# Patient Record
Sex: MALE | Race: WHITE | Employment: UNEMPLOYED | URBAN - METROPOLITAN AREA
[De-identification: names, ages, dates, MRNs, and addresses within clinical notes are randomized per-mention and may not be internally consistent; named-entity substitution may affect disease eponyms.]

---

## 2019-01-01 ENCOUNTER — OFFICE VISIT (OUTPATIENT)
Dept: FAMILY MEDICINE CLINIC | Facility: CLINIC | Age: 59
End: 2019-01-01

## 2019-01-01 ENCOUNTER — TELEPHONE (OUTPATIENT)
Dept: FAMILY MEDICINE CLINIC | Facility: CLINIC | Age: 59
End: 2019-01-01

## 2019-01-01 ENCOUNTER — DOCUMENTATION ONLY (OUTPATIENT)
Dept: FAMILY MEDICINE CLINIC | Facility: CLINIC | Age: 59
End: 2019-01-01

## 2019-01-01 VITALS
BODY MASS INDEX: 24.75 KG/M2 | HEIGHT: 66 IN | WEIGHT: 154 LBS | HEART RATE: 67 BPM | DIASTOLIC BLOOD PRESSURE: 60 MMHG | OXYGEN SATURATION: 96 % | SYSTOLIC BLOOD PRESSURE: 102 MMHG | TEMPERATURE: 97.7 F | RESPIRATION RATE: 16 BRPM

## 2019-01-01 DIAGNOSIS — F17.200 CURRENT SMOKER: ICD-10-CM

## 2019-01-01 DIAGNOSIS — G62.9 NEUROPATHY: ICD-10-CM

## 2019-01-01 DIAGNOSIS — L84 CALLUS OF FOOT: ICD-10-CM

## 2019-01-01 DIAGNOSIS — Z13.31 SCREENING FOR DEPRESSION: ICD-10-CM

## 2019-01-01 DIAGNOSIS — K43.9 ABDOMINAL WALL HERNIA: Primary | ICD-10-CM

## 2019-01-29 ENCOUNTER — OFFICE VISIT (OUTPATIENT)
Dept: FAMILY MEDICINE CLINIC | Facility: CLINIC | Age: 59
End: 2019-01-29

## 2019-01-29 VITALS
RESPIRATION RATE: 14 BRPM | OXYGEN SATURATION: 98 % | TEMPERATURE: 98 F | SYSTOLIC BLOOD PRESSURE: 160 MMHG | WEIGHT: 152 LBS | DIASTOLIC BLOOD PRESSURE: 101 MMHG | BODY MASS INDEX: 24.43 KG/M2 | HEIGHT: 66 IN | HEART RATE: 78 BPM

## 2019-01-29 DIAGNOSIS — F17.200 CURRENT SMOKER: ICD-10-CM

## 2019-01-29 DIAGNOSIS — F32.A DEPRESSION, UNSPECIFIED DEPRESSION TYPE: ICD-10-CM

## 2019-01-29 DIAGNOSIS — R03.0 ELEVATED BLOOD-PRESSURE READING WITHOUT DIAGNOSIS OF HYPERTENSION: Primary | ICD-10-CM

## 2019-01-29 DIAGNOSIS — Z12.11 SCREENING FOR COLON CANCER: ICD-10-CM

## 2019-01-29 DIAGNOSIS — G62.9 NEUROPATHY: ICD-10-CM

## 2019-01-29 DIAGNOSIS — L84 CALLUS OF FOOT: ICD-10-CM

## 2019-01-29 DIAGNOSIS — Z13.220 SCREENING FOR LIPID DISORDERS: ICD-10-CM

## 2019-01-29 DIAGNOSIS — R03.0 ELEVATED BLOOD-PRESSURE READING WITHOUT DIAGNOSIS OF HYPERTENSION: ICD-10-CM

## 2019-01-29 DIAGNOSIS — Z11.59 NEED FOR HEPATITIS C SCREENING TEST: ICD-10-CM

## 2019-01-29 DIAGNOSIS — F98.8 ATTENTION DEFICIT DISORDER, UNSPECIFIED HYPERACTIVITY PRESENCE: ICD-10-CM

## 2019-01-29 DIAGNOSIS — H53.452 DECREASED PERIPHERAL VISION OF LEFT EYE: ICD-10-CM

## 2019-01-29 DIAGNOSIS — Z78.9 HISTORY OF EXCESSIVE CERUMEN: ICD-10-CM

## 2019-01-29 RX ORDER — GABAPENTIN 300 MG/1
300 CAPSULE ORAL 3 TIMES DAILY
Qty: 90 CAP | Refills: 1 | Status: SHIPPED | OUTPATIENT
Start: 2019-01-29 | End: 2019-02-19 | Stop reason: DRUGHIGH
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
## 2019-02-19 ENCOUNTER — OFFICE VISIT (OUTPATIENT)
Dept: FAMILY MEDICINE CLINIC | Facility: CLINIC | Age: 59
End: 2019-02-19

## 2019-02-19 VITALS
TEMPERATURE: 98.2 F | HEART RATE: 62 BPM | WEIGHT: 151.8 LBS | DIASTOLIC BLOOD PRESSURE: 72 MMHG | SYSTOLIC BLOOD PRESSURE: 130 MMHG | BODY MASS INDEX: 24.4 KG/M2 | HEIGHT: 66 IN | OXYGEN SATURATION: 98 % | RESPIRATION RATE: 14 BRPM

## 2019-02-19 DIAGNOSIS — F32.A DEPRESSION, UNSPECIFIED DEPRESSION TYPE: ICD-10-CM

## 2019-02-19 DIAGNOSIS — G62.9 NEUROPATHY: Primary | ICD-10-CM

## 2019-02-19 DIAGNOSIS — F17.200 CURRENT SMOKER: ICD-10-CM

## 2019-02-19 DIAGNOSIS — Z11.59 NEED FOR HEPATITIS C SCREENING TEST: ICD-10-CM

## 2019-02-19 DIAGNOSIS — F98.8 ATTENTION DEFICIT DISORDER, UNSPECIFIED HYPERACTIVITY PRESENCE: ICD-10-CM

## 2019-02-19 DIAGNOSIS — H53.452 DECREASED PERIPHERAL VISION OF LEFT EYE: ICD-10-CM

## 2019-02-19 RX ORDER — GABAPENTIN 600 MG/1
600 TABLET ORAL 3 TIMES DAILY
Qty: 90 TAB | Refills: 2 | Status: SHIPPED | OUTPATIENT
Start: 2019-02-19

## 2019-02-19 RX ORDER — VARENICLINE TARTRATE 25 MG
KIT ORAL
Qty: 1 DOSE PACK | Refills: 0 | Status: SHIPPED | OUTPATIENT
Start: 2019-02-19
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
## 2019-02-19 NOTE — PROGRESS NOTES
Jorge Landry. is a 62 y.o.@ presents today for office visit for follow up. Pt is in Room # 4.     1. Have you been to the ER, urgent care clinic since your last visit? Hospitalized since your last visit? No    2. Have you seen or consulted any other health care providers outside of the [address] [place] [address] since your last visit? Include any pap smears or colon screening. No         Health Maintenance reviewed - up to date.  .    Requested Prescriptions      No prescriptions requested or ordered in this encounter       Visit Vitals  /72 (BP 1 Location: Left arm, BP Patient Position: Sitting)   Pulse 62   Temp 98.2 °F (36.8 °C) (Oral)   Resp 14   Ht 5' 6\" (1.676 m)   Wt 151 lb 12.8 oz (68.9 kg)   SpO2 98%   BMI 24.50 kg/m²          Upcoming Appts  No.     VORB: No orders of the defined types were placed in this encounter.   MD Deb Duran LPN

## 2019-02-23 LAB
ALBUMIN SERPL-MCNC: 4.3 G/DL (ref 3.6–4.8)
ALBUMIN/GLOB SERPL: 1.8 {RATIO} (ref 1.2–2.2)
ALP SERPL-CCNC: 79 IU/L (ref 39–117)
ALT SERPL-CCNC: 19 IU/L (ref 0–44)
AST SERPL-CCNC: 18 IU/L (ref 0–40)
BILIRUB SERPL-MCNC: <0.2 MG/DL (ref 0–1.2)
BUN SERPL-MCNC: 8 MG/DL (ref 8–27)
BUN/CREAT SERPL: 8 (ref 10–24)
CALCIUM SERPL-MCNC: 9.2 MG/DL (ref 8.6–10.2)
CHLORIDE SERPL-SCNC: 102 MMOL/L (ref 96–106)
CHOLEST SERPL-MCNC: 181 MG/DL (ref 100–199)
CO2 SERPL-SCNC: 23 MMOL/L (ref 20–29)
CREAT SERPL-MCNC: 1 MG/DL (ref 0.76–1.27)
ERYTHROCYTE [DISTWIDTH] IN BLOOD BY AUTOMATED COUNT: 14.5 % (ref 12.3–15.4)
FOLATE SERPL-MCNC: 11.6 NG/ML
GLOBULIN SER CALC-MCNC: 2.4 G/DL (ref 1.5–4.5)
GLUCOSE SERPL-MCNC: 86 MG/DL (ref 65–99)
HCT VFR BLD AUTO: 40.6 % (ref 37.5–51)
HCV AB S/CO SERPL IA: 1.1 S/CO RATIO (ref 0–0.9)
HCV AB S/CO SERPL IA: 1.1 S/CO RATIO (ref 0–0.9)
HCV RNA SERPL QL NAA+PROBE: NEGATIVE
HDLC SERPL-MCNC: 40 MG/DL
HEMOCCULT STL QL IA: NORMAL
HGB BLD-MCNC: 13.8 G/DL (ref 13–17.7)
INTERPRETATION, 910389: NORMAL
LDLC SERPL CALC-MCNC: 124 MG/DL (ref 0–99)
MCH RBC QN AUTO: 30.9 PG (ref 26.6–33)
MCHC RBC AUTO-ENTMCNC: 34 G/DL (ref 31.5–35.7)
MCV RBC AUTO: 91 FL (ref 79–97)
PLATELET # BLD AUTO: 379 X10E3/UL (ref 150–379)
POTASSIUM SERPL-SCNC: 5.3 MMOL/L (ref 3.5–5.2)
PROT SERPL-MCNC: 6.7 G/DL (ref 6–8.5)
RBC # BLD AUTO: 4.47 X10E6/UL (ref 4.14–5.8)
SODIUM SERPL-SCNC: 141 MMOL/L (ref 134–144)
TRIGL SERPL-MCNC: 83 MG/DL (ref 0–149)
TSH SERPL DL<=0.005 MIU/L-ACNC: 1.93 UIU/ML (ref 0.45–4.5)
VIT B12 SERPL-MCNC: 1097 PG/ML (ref 232–1245)
VLDLC SERPL CALC-MCNC: 17 MG/DL (ref 5–40)
WBC # BLD AUTO: 8 X10E3/UL (ref 3.4–10.8)

## 2019-03-12 NOTE — PROGRESS NOTES
CBC, TSH, vitamin B12 and Folate are wnl. CMP shows borderline elevated potassium. Will need to repeat lab. Lipid panel shows elevated LDL. Recommend lifestyle modification with healthy diet including fruits, vegetables, decreased saturated fats and carbohydrates, and regular exercise. Hepatitis C RNA is negative.

## 2019-03-25 PROBLEM — E78.00 PURE HYPERCHOLESTEROLEMIA: Status: ACTIVE | Noted: 2019-01-01

## 2019-03-25 PROBLEM — R03.0 ELEVATED BLOOD-PRESSURE READING WITHOUT DIAGNOSIS OF HYPERTENSION: Status: RESOLVED | Noted: 2019-01-29 | Resolved: 2019-01-01

## 2019-03-25 PROBLEM — F32.A DEPRESSION: Status: RESOLVED | Noted: 2019-01-29 | Resolved: 2019-01-01

## 2019-03-25 NOTE — PATIENT INSTRUCTIONS

## 2019-03-25 NOTE — TELEPHONE ENCOUNTER
Dr. Michelle Sousa requested that I come to the exam room to discuss Mr. Winsome Solis request for an increased dosage of gabapentin. Dr. Michelle Sousa informed me that Mr. Hardik Bolton was complaining of neuropathy, wanted to increase his dosage of gabapentin from 600mg three times a day to 900mg three times a day, and was not accepting her advise to see a podiatrist. She explained that his aggressiveness was making her uncomfortable to reenter the exam room. I entered the room and explained to Mr. Galindo that Dr. Michelle Sousa recommended that he see a podiatrist for his neuropathy instead of increasing his gabapentin dosage. Mr. Hardik Bolton became aggravated with this and said \"I'm on my fucking feet all day long. I just need the medicine to get by\". I explained that this was even more reason to visit a podiatrist since they are further trained to address his issues. He told me that we were done and told me to get out of the room. Upon exiting the room I told Mr. Hardik Bolton that we were here to help him and just wanted to see him feel better. He yelled that we [place] a fucking refugee for a doctor and he would not be coming back\". I escorted Mr. Hardik Bolton to the lobby were he continued the verbally abusive behavior.

## 2019-03-25 NOTE — PROGRESS NOTES
Marlene Briggs is a 62 y.o.  male presents today for office visit for follow up. Pt would also like to discuss stomach issues . Pt is not fasting. Pt is in Room # 3 1. Have you been to the ER, urgent care clinic since your last visit? Hospitalized since your last visit? No 
 
2. Have you seen or consulted any other health care providers outside of the [address] [place] [address] since your last visit? Include any pap smears or colon screening. No 
 
Upcoming Appts 
none Health Maintenance reviewed pneu 23: declined VORB: No orders of the defined types were placed in this encounter.  
Chela Cole LPN
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
## 2019-05-30 NOTE — TELEPHONE ENCOUNTER
Patient called back to ask about his refill of gabapentin. I reminded him that he had been discharged from the practice. I told him his last prescription was issued on 2/19/19 with two refills. He started screaming that he should have had three and continued with further abusive comments. I reminded the patient that he had been discharged from the practice and hung up.

## 2020-01-01 ENCOUNTER — APPOINTMENT (OUTPATIENT)
Dept: GENERAL RADIOLOGY | Age: 60
DRG: 224 | End: 2020-01-01
Attending: NURSE PRACTITIONER
Payer: MEDICAID

## 2020-01-01 ENCOUNTER — ANESTHESIA (OUTPATIENT)
Dept: SURGERY | Age: 60
DRG: 224 | End: 2020-01-01
Payer: MEDICAID

## 2020-01-01 ENCOUNTER — APPOINTMENT (OUTPATIENT)
Dept: CT IMAGING | Age: 60
DRG: 224 | End: 2020-01-01
Attending: EMERGENCY MEDICINE
Payer: MEDICAID

## 2020-01-01 ENCOUNTER — ANESTHESIA EVENT (OUTPATIENT)
Dept: MEDSURG UNIT | Age: 60
DRG: 224 | End: 2020-01-01
Payer: MEDICAID

## 2020-01-01 ENCOUNTER — ANESTHESIA EVENT (OUTPATIENT)
Dept: SURGERY | Age: 60
DRG: 224 | End: 2020-01-01
Payer: MEDICAID

## 2020-01-01 ENCOUNTER — HOSPITAL ENCOUNTER (INPATIENT)
Age: 60
LOS: 7 days | DRG: 224 | End: 2020-03-05
Attending: EMERGENCY MEDICINE | Admitting: SURGERY
Payer: MEDICAID

## 2020-01-01 ENCOUNTER — ANESTHESIA (OUTPATIENT)
Dept: MEDSURG UNIT | Age: 60
DRG: 224 | End: 2020-01-01
Payer: MEDICAID

## 2020-01-01 VITALS
SYSTOLIC BLOOD PRESSURE: 125 MMHG | HEIGHT: 66 IN | BODY MASS INDEX: 24.86 KG/M2 | HEART RATE: 100 BPM | OXYGEN SATURATION: 92 % | TEMPERATURE: 98.5 F | DIASTOLIC BLOOD PRESSURE: 80 MMHG | RESPIRATION RATE: 20 BRPM

## 2020-01-01 DIAGNOSIS — K56.609 SBO (SMALL BOWEL OBSTRUCTION) (HCC): ICD-10-CM

## 2020-01-01 DIAGNOSIS — K56.609 SMALL BOWEL OBSTRUCTION (HCC): Primary | ICD-10-CM

## 2020-01-01 LAB
ALBUMIN SERPL-MCNC: 4.4 G/DL (ref 3.4–5)
ALBUMIN/GLOB SERPL: 1.4 {RATIO} (ref 0.8–1.7)
ALP SERPL-CCNC: 93 U/L (ref 45–117)
ALT SERPL-CCNC: 32 U/L (ref 16–61)
ANION GAP SERPL CALC-SCNC: 11 MMOL/L (ref 3–18)
ANION GAP SERPL CALC-SCNC: 12 MMOL/L (ref 3–18)
ANION GAP SERPL CALC-SCNC: 8 MMOL/L (ref 3–18)
ANION GAP SERPL CALC-SCNC: 9 MMOL/L (ref 3–18)
ANION GAP SERPL CALC-SCNC: 9 MMOL/L (ref 3–18)
APPEARANCE UR: CLEAR
AST SERPL-CCNC: 25 U/L (ref 10–38)
BACTERIA URNS QL MICRO: ABNORMAL /HPF
BASOPHILS # BLD: 0 K/UL (ref 0–0.1)
BASOPHILS NFR BLD: 0 % (ref 0–2)
BILIRUB SERPL-MCNC: 1 MG/DL (ref 0.2–1)
BILIRUB UR QL: NEGATIVE
BUN SERPL-MCNC: 19 MG/DL (ref 7–18)
BUN SERPL-MCNC: 23 MG/DL (ref 7–18)
BUN SERPL-MCNC: 28 MG/DL (ref 7–18)
BUN SERPL-MCNC: 28 MG/DL (ref 7–18)
BUN SERPL-MCNC: 33 MG/DL (ref 7–18)
BUN/CREAT SERPL: 21 (ref 12–20)
BUN/CREAT SERPL: 27 (ref 12–20)
BUN/CREAT SERPL: 27 (ref 12–20)
BUN/CREAT SERPL: 28 (ref 12–20)
BUN/CREAT SERPL: 32 (ref 12–20)
CALCIUM SERPL-MCNC: 8.4 MG/DL (ref 8.5–10.1)
CALCIUM SERPL-MCNC: 8.5 MG/DL (ref 8.5–10.1)
CALCIUM SERPL-MCNC: 8.5 MG/DL (ref 8.5–10.1)
CALCIUM SERPL-MCNC: 9.1 MG/DL (ref 8.5–10.1)
CALCIUM SERPL-MCNC: 9.7 MG/DL (ref 8.5–10.1)
CHLORIDE SERPL-SCNC: 93 MMOL/L (ref 100–111)
CHLORIDE SERPL-SCNC: 94 MMOL/L (ref 100–111)
CHLORIDE SERPL-SCNC: 94 MMOL/L (ref 100–111)
CHLORIDE SERPL-SCNC: 97 MMOL/L (ref 100–111)
CHLORIDE SERPL-SCNC: 98 MMOL/L (ref 100–111)
CO2 SERPL-SCNC: 24 MMOL/L (ref 21–32)
CO2 SERPL-SCNC: 26 MMOL/L (ref 21–32)
CO2 SERPL-SCNC: 27 MMOL/L (ref 21–32)
CO2 SERPL-SCNC: 34 MMOL/L (ref 21–32)
CO2 SERPL-SCNC: 36 MMOL/L (ref 21–32)
COLOR UR: YELLOW
CREAT SERPL-MCNC: 0.85 MG/DL (ref 0.6–1.3)
CREAT SERPL-MCNC: 0.88 MG/DL (ref 0.6–1.3)
CREAT SERPL-MCNC: 0.91 MG/DL (ref 0.6–1.3)
CREAT SERPL-MCNC: 1.01 MG/DL (ref 0.6–1.3)
CREAT SERPL-MCNC: 1.23 MG/DL (ref 0.6–1.3)
DIFFERENTIAL METHOD BLD: ABNORMAL
EOSINOPHIL # BLD: 0 K/UL (ref 0–0.4)
EOSINOPHIL NFR BLD: 0 % (ref 0–5)
ERYTHROCYTE [DISTWIDTH] IN BLOOD BY AUTOMATED COUNT: 12.9 % (ref 11.6–14.5)
ERYTHROCYTE [DISTWIDTH] IN BLOOD BY AUTOMATED COUNT: 13.2 % (ref 11.6–14.5)
ERYTHROCYTE [DISTWIDTH] IN BLOOD BY AUTOMATED COUNT: 13.2 % (ref 11.6–14.5)
ERYTHROCYTE [DISTWIDTH] IN BLOOD BY AUTOMATED COUNT: 13.4 % (ref 11.6–14.5)
GLOBULIN SER CALC-MCNC: 3.1 G/DL (ref 2–4)
GLUCOSE BLD STRIP.AUTO-MCNC: 105 MG/DL (ref 70–110)
GLUCOSE BLD STRIP.AUTO-MCNC: 96 MG/DL (ref 70–110)
GLUCOSE SERPL-MCNC: 114 MG/DL (ref 74–99)
GLUCOSE SERPL-MCNC: 122 MG/DL (ref 74–99)
GLUCOSE SERPL-MCNC: 123 MG/DL (ref 74–99)
GLUCOSE SERPL-MCNC: 156 MG/DL (ref 74–99)
GLUCOSE SERPL-MCNC: 94 MG/DL (ref 74–99)
GLUCOSE UR STRIP.AUTO-MCNC: 100 MG/DL
HCT VFR BLD AUTO: 38.9 % (ref 36–48)
HCT VFR BLD AUTO: 39.4 % (ref 36–48)
HCT VFR BLD AUTO: 40.3 % (ref 36–48)
HCT VFR BLD AUTO: 41 % (ref 36–48)
HGB BLD-MCNC: 13.2 G/DL (ref 13–16)
HGB BLD-MCNC: 13.3 G/DL (ref 13–16)
HGB BLD-MCNC: 14.1 G/DL (ref 13–16)
HGB BLD-MCNC: 14.3 G/DL (ref 13–16)
HGB UR QL STRIP: NEGATIVE
KETONES UR QL STRIP.AUTO: 40 MG/DL
LEUKOCYTE ESTERASE UR QL STRIP.AUTO: NEGATIVE
LIPASE SERPL-CCNC: 91 U/L (ref 73–393)
LYMPHOCYTES # BLD: 0.3 K/UL (ref 0.9–3.6)
LYMPHOCYTES # BLD: 0.4 K/UL (ref 0.9–3.6)
LYMPHOCYTES # BLD: 0.6 K/UL (ref 0.9–3.6)
LYMPHOCYTES # BLD: 1.3 K/UL (ref 0.9–3.6)
LYMPHOCYTES NFR BLD: 13 % (ref 21–52)
LYMPHOCYTES NFR BLD: 15 % (ref 21–52)
LYMPHOCYTES NFR BLD: 6 % (ref 21–52)
LYMPHOCYTES NFR BLD: 7 % (ref 21–52)
MCH RBC QN AUTO: 30.9 PG (ref 24–34)
MCH RBC QN AUTO: 31.2 PG (ref 24–34)
MCH RBC QN AUTO: 31.4 PG (ref 24–34)
MCH RBC QN AUTO: 31.5 PG (ref 24–34)
MCHC RBC AUTO-ENTMCNC: 33.8 G/DL (ref 31–37)
MCHC RBC AUTO-ENTMCNC: 33.9 G/DL (ref 31–37)
MCHC RBC AUTO-ENTMCNC: 34.9 G/DL (ref 31–37)
MCHC RBC AUTO-ENTMCNC: 35 G/DL (ref 31–37)
MCV RBC AUTO: 89.5 FL (ref 74–97)
MCV RBC AUTO: 90 FL (ref 74–97)
MCV RBC AUTO: 91.1 FL (ref 74–97)
MCV RBC AUTO: 92.9 FL (ref 74–97)
MONOCYTES # BLD: 0.1 K/UL (ref 0.05–1.2)
MONOCYTES # BLD: 0.7 K/UL (ref 0.05–1.2)
MONOCYTES # BLD: 0.7 K/UL (ref 0.05–1.2)
MONOCYTES # BLD: 0.8 K/UL (ref 0.05–1.2)
MONOCYTES NFR BLD: 1 % (ref 3–10)
MONOCYTES NFR BLD: 14 % (ref 3–10)
MONOCYTES NFR BLD: 15 % (ref 3–10)
MONOCYTES NFR BLD: 20 % (ref 3–10)
NEUTS SEG # BLD: 2.4 K/UL (ref 1.8–8)
NEUTS SEG # BLD: 3.7 K/UL (ref 1.8–8)
NEUTS SEG # BLD: 4.3 K/UL (ref 1.8–8)
NEUTS SEG # BLD: 8.8 K/UL (ref 1.8–8)
NEUTS SEG NFR BLD: 65 % (ref 40–73)
NEUTS SEG NFR BLD: 78 % (ref 40–73)
NEUTS SEG NFR BLD: 80 % (ref 40–73)
NEUTS SEG NFR BLD: 86 % (ref 40–73)
NITRITE UR QL STRIP.AUTO: NEGATIVE
PH UR STRIP: 6 [PH] (ref 5–8)
PLATELET # BLD AUTO: 247 K/UL (ref 135–420)
PLATELET # BLD AUTO: 265 K/UL (ref 135–420)
PLATELET # BLD AUTO: 285 K/UL (ref 135–420)
PLATELET # BLD AUTO: 293 K/UL (ref 135–420)
PMV BLD AUTO: 10 FL (ref 9.2–11.8)
PMV BLD AUTO: 10.2 FL (ref 9.2–11.8)
PMV BLD AUTO: 9.7 FL (ref 9.2–11.8)
PMV BLD AUTO: 9.7 FL (ref 9.2–11.8)
POTASSIUM SERPL-SCNC: 2.8 MMOL/L (ref 3.5–5.5)
POTASSIUM SERPL-SCNC: 3.1 MMOL/L (ref 3.5–5.5)
POTASSIUM SERPL-SCNC: 3.5 MMOL/L (ref 3.5–5.5)
POTASSIUM SERPL-SCNC: 3.7 MMOL/L (ref 3.5–5.5)
POTASSIUM SERPL-SCNC: 3.9 MMOL/L (ref 3.5–5.5)
PROT SERPL-MCNC: 7.5 G/DL (ref 6.4–8.2)
PROT UR STRIP-MCNC: 30 MG/DL
RBC # BLD AUTO: 4.24 M/UL (ref 4.7–5.5)
RBC # BLD AUTO: 4.27 M/UL (ref 4.7–5.5)
RBC # BLD AUTO: 4.48 M/UL (ref 4.7–5.5)
RBC # BLD AUTO: 4.58 M/UL (ref 4.7–5.5)
RBC #/AREA URNS HPF: ABNORMAL /HPF (ref 0–5)
SODIUM SERPL-SCNC: 129 MMOL/L (ref 136–145)
SODIUM SERPL-SCNC: 132 MMOL/L (ref 136–145)
SODIUM SERPL-SCNC: 132 MMOL/L (ref 136–145)
SODIUM SERPL-SCNC: 137 MMOL/L (ref 136–145)
SODIUM SERPL-SCNC: 142 MMOL/L (ref 136–145)
SP GR UR REFRACTOMETRY: 1.03 (ref 1–1.03)
UROBILINOGEN UR QL STRIP.AUTO: 1 EU/DL (ref 0.2–1)
WBC # BLD AUTO: 10.2 K/UL (ref 4.6–13.2)
WBC # BLD AUTO: 3.7 K/UL (ref 4.6–13.2)
WBC # BLD AUTO: 4.7 K/UL (ref 4.6–13.2)
WBC # BLD AUTO: 5.5 K/UL (ref 4.6–13.2)
WBC URNS QL MICRO: ABNORMAL /HPF (ref 0–4)

## 2020-01-01 PROCEDURE — 99285 EMERGENCY DEPT VISIT HI MDM: CPT

## 2020-01-01 PROCEDURE — 96376 TX/PRO/DX INJ SAME DRUG ADON: CPT

## 2020-01-01 PROCEDURE — 65270000029 HC RM PRIVATE

## 2020-01-01 PROCEDURE — 74011250636 HC RX REV CODE- 250/636: Performed by: NURSE PRACTITIONER

## 2020-01-01 PROCEDURE — 5A1935Z RESPIRATORY VENTILATION, LESS THAN 24 CONSECUTIVE HOURS: ICD-10-PCS | Performed by: INTERNAL MEDICINE

## 2020-01-01 PROCEDURE — 0D9670Z DRAINAGE OF STOMACH WITH DRAINAGE DEVICE, VIA NATURAL OR ARTIFICIAL OPENING: ICD-10-PCS | Performed by: SURGERY

## 2020-01-01 PROCEDURE — 74177 CT ABD & PELVIS W/CONTRAST: CPT

## 2020-01-01 PROCEDURE — 74011250636 HC RX REV CODE- 250/636: Performed by: EMERGENCY MEDICINE

## 2020-01-01 PROCEDURE — 83690 ASSAY OF LIPASE: CPT

## 2020-01-01 PROCEDURE — C9113 INJ PANTOPRAZOLE SODIUM, VIA: HCPCS | Performed by: EMERGENCY MEDICINE

## 2020-01-01 PROCEDURE — 77030031139 HC SUT VCRL2 J&J -A: Performed by: SURGERY

## 2020-01-01 PROCEDURE — 80048 BASIC METABOLIC PNL TOTAL CA: CPT

## 2020-01-01 PROCEDURE — 82962 GLUCOSE BLOOD TEST: CPT

## 2020-01-01 PROCEDURE — 77030033827 HC SLV COMPR SCD THGH COVD -B: Performed by: SURGERY

## 2020-01-01 PROCEDURE — 5A12012 PERFORMANCE OF CARDIAC OUTPUT, SINGLE, MANUAL: ICD-10-PCS | Performed by: INTERNAL MEDICINE

## 2020-01-01 PROCEDURE — 77030008771 HC TU NG SALEM SUMP -A

## 2020-01-01 PROCEDURE — 77030011267 HC ELECTRD BLD COVD -A: Performed by: SURGERY

## 2020-01-01 PROCEDURE — 74011250636 HC RX REV CODE- 250/636: Performed by: SURGERY

## 2020-01-01 PROCEDURE — 43752 NASAL/OROGASTRIC W/TUBE PLMT: CPT

## 2020-01-01 PROCEDURE — 85025 COMPLETE CBC W/AUTO DIFF WBC: CPT

## 2020-01-01 PROCEDURE — 0CJY7ZZ INSPECTION OF MOUTH AND THROAT, VIA NATURAL OR ARTIFICIAL OPENING: ICD-10-PCS | Performed by: RADIOLOGY

## 2020-01-01 PROCEDURE — 36415 COLL VENOUS BLD VENIPUNCTURE: CPT

## 2020-01-01 PROCEDURE — 76210000016 HC OR PH I REC 1 TO 1.5 HR: Performed by: SURGERY

## 2020-01-01 PROCEDURE — 77030003028 HC SUT VCRL J&J -A: Performed by: SURGERY

## 2020-01-01 PROCEDURE — 74011000250 HC RX REV CODE- 250: Performed by: NURSE ANESTHETIST, CERTIFIED REGISTERED

## 2020-01-01 PROCEDURE — 74011000250 HC RX REV CODE- 250: Performed by: SURGERY

## 2020-01-01 PROCEDURE — 77030008477 HC STYL SATN SLP COVD -A: Performed by: ANESTHESIOLOGY

## 2020-01-01 PROCEDURE — 74011250636 HC RX REV CODE- 250/636

## 2020-01-01 PROCEDURE — 81001 URINALYSIS AUTO W/SCOPE: CPT

## 2020-01-01 PROCEDURE — 92950 HEART/LUNG RESUSCITATION CPR: CPT

## 2020-01-01 PROCEDURE — 77030018723 HC ELCTRD BLD COVD -A: Performed by: SURGERY

## 2020-01-01 PROCEDURE — 97112 NEUROMUSCULAR REEDUCATION: CPT

## 2020-01-01 PROCEDURE — 77030008771 HC TU NG SALEM SUMP -A: Performed by: ANESTHESIOLOGY

## 2020-01-01 PROCEDURE — 76060000033 HC ANESTHESIA 1 TO 1.5 HR: Performed by: SURGERY

## 2020-01-01 PROCEDURE — 77030011278 HC ELECTRD LIG IMPT COVD -F: Performed by: SURGERY

## 2020-01-01 PROCEDURE — 77030002966 HC SUT PDS J&J -A: Performed by: SURGERY

## 2020-01-01 PROCEDURE — 96374 THER/PROPH/DIAG INJ IV PUSH: CPT

## 2020-01-01 PROCEDURE — 0DQV0ZZ REPAIR MESENTERY, OPEN APPROACH: ICD-10-PCS | Performed by: SURGERY

## 2020-01-01 PROCEDURE — 96375 TX/PRO/DX INJ NEW DRUG ADDON: CPT

## 2020-01-01 PROCEDURE — 0DNW0ZZ RELEASE PERITONEUM, OPEN APPROACH: ICD-10-PCS | Performed by: SURGERY

## 2020-01-01 PROCEDURE — 77030018846 HC SOL IRR STRL H20 ICUM -A: Performed by: SURGERY

## 2020-01-01 PROCEDURE — 74011636320 HC RX REV CODE- 636/320: Performed by: EMERGENCY MEDICINE

## 2020-01-01 PROCEDURE — 88304 TISSUE EXAM BY PATHOLOGIST: CPT

## 2020-01-01 PROCEDURE — 77030018842 HC SOL IRR SOD CL 9% BAXT -A: Performed by: SURGERY

## 2020-01-01 PROCEDURE — 0BH17EZ INSERTION OF ENDOTRACHEAL AIRWAY INTO TRACHEA, VIA NATURAL OR ARTIFICIAL OPENING: ICD-10-PCS | Performed by: NURSE ANESTHETIST, CERTIFIED REGISTERED

## 2020-01-01 PROCEDURE — 74011250636 HC RX REV CODE- 250/636: Performed by: NURSE ANESTHETIST, CERTIFIED REGISTERED

## 2020-01-01 PROCEDURE — 74011000272 HC RX REV CODE- 272: Performed by: SURGERY

## 2020-01-01 PROCEDURE — 97116 GAIT TRAINING THERAPY: CPT

## 2020-01-01 PROCEDURE — 88305 TISSUE EXAM BY PATHOLOGIST: CPT

## 2020-01-01 PROCEDURE — 80053 COMPREHEN METABOLIC PANEL: CPT

## 2020-01-01 PROCEDURE — 5A19054 RESPIRATORY VENTILATION, SINGLE, NONMECHANICAL: ICD-10-PCS | Performed by: INTERNAL MEDICINE

## 2020-01-01 PROCEDURE — 77030011276 HC ELECTRD LIG AXS COVD -E: Performed by: SURGERY

## 2020-01-01 PROCEDURE — 77030008462 HC STPLR SKN PROX J&J -A: Performed by: SURGERY

## 2020-01-01 PROCEDURE — 77030013079 HC BLNKT BAIR HGGR 3M -A: Performed by: ANESTHESIOLOGY

## 2020-01-01 PROCEDURE — 77030008683 HC TU ET CUF COVD -A: Performed by: ANESTHESIOLOGY

## 2020-01-01 PROCEDURE — 0WPG0JZ REMOVAL OF SYNTHETIC SUBSTITUTE FROM PERITONEAL CAVITY, OPEN APPROACH: ICD-10-PCS | Performed by: SURGERY

## 2020-01-01 PROCEDURE — 76010000149 HC OR TIME 1 TO 1.5 HR: Performed by: SURGERY

## 2020-01-01 PROCEDURE — 97162 PT EVAL MOD COMPLEX 30 MIN: CPT

## 2020-01-01 RX ORDER — ONDANSETRON 2 MG/ML
4 INJECTION INTRAMUSCULAR; INTRAVENOUS
Status: COMPLETED | OUTPATIENT
Start: 2020-01-01 | End: 2020-01-01

## 2020-01-01 RX ORDER — INSULIN LISPRO 100 [IU]/ML
INJECTION, SOLUTION INTRAVENOUS; SUBCUTANEOUS ONCE
Status: DISCONTINUED | OUTPATIENT
Start: 2020-01-01 | End: 2020-01-01 | Stop reason: HOSPADM

## 2020-01-01 RX ORDER — METOCLOPRAMIDE HYDROCHLORIDE 5 MG/ML
5 INJECTION INTRAMUSCULAR; INTRAVENOUS
Status: COMPLETED | OUTPATIENT
Start: 2020-01-01 | End: 2020-01-01

## 2020-01-01 RX ORDER — NALOXONE HYDROCHLORIDE 0.4 MG/ML
0.04 INJECTION, SOLUTION INTRAMUSCULAR; INTRAVENOUS; SUBCUTANEOUS AS NEEDED
Status: DISCONTINUED | OUTPATIENT
Start: 2020-01-01 | End: 2020-01-01 | Stop reason: HOSPADM

## 2020-01-01 RX ORDER — SODIUM CHLORIDE 0.9 % (FLUSH) 0.9 %
5-40 SYRINGE (ML) INJECTION AS NEEDED
Status: DISCONTINUED | OUTPATIENT
Start: 2020-01-01 | End: 2020-01-01 | Stop reason: HOSPADM

## 2020-01-01 RX ORDER — MORPHINE SULFATE 4 MG/ML
4 INJECTION, SOLUTION INTRAMUSCULAR; INTRAVENOUS
Status: COMPLETED | OUTPATIENT
Start: 2020-01-01 | End: 2020-01-01

## 2020-01-01 RX ORDER — ONDANSETRON 2 MG/ML
INJECTION INTRAMUSCULAR; INTRAVENOUS AS NEEDED
Status: DISCONTINUED | OUTPATIENT
Start: 2020-01-01 | End: 2020-01-01 | Stop reason: HOSPADM

## 2020-01-01 RX ORDER — KETOROLAC TROMETHAMINE 30 MG/ML
30 INJECTION, SOLUTION INTRAMUSCULAR; INTRAVENOUS EVERY 6 HOURS
Status: DISCONTINUED | OUTPATIENT
Start: 2020-01-01 | End: 2020-01-01 | Stop reason: HOSPADM

## 2020-01-01 RX ORDER — ONDANSETRON 2 MG/ML
6 INJECTION INTRAMUSCULAR; INTRAVENOUS
Status: DISCONTINUED | OUTPATIENT
Start: 2020-01-01 | End: 2020-01-01 | Stop reason: HOSPADM

## 2020-01-01 RX ORDER — LIDOCAINE HYDROCHLORIDE 20 MG/ML
INJECTION, SOLUTION EPIDURAL; INFILTRATION; INTRACAUDAL; PERINEURAL AS NEEDED
Status: DISCONTINUED | OUTPATIENT
Start: 2020-01-01 | End: 2020-01-01 | Stop reason: HOSPADM

## 2020-01-01 RX ORDER — OXYCODONE AND ACETAMINOPHEN 5; 325 MG/1; MG/1
1 TABLET ORAL ONCE
Status: DISCONTINUED | OUTPATIENT
Start: 2020-01-01 | End: 2020-01-01 | Stop reason: HOSPADM

## 2020-01-01 RX ORDER — POTASSIUM CHLORIDE 7.45 MG/ML
10 INJECTION INTRAVENOUS
Status: COMPLETED | OUTPATIENT
Start: 2020-01-01 | End: 2020-01-01

## 2020-01-01 RX ORDER — NALOXONE HYDROCHLORIDE 0.4 MG/ML
0.4 INJECTION, SOLUTION INTRAMUSCULAR; INTRAVENOUS; SUBCUTANEOUS AS NEEDED
Status: DISCONTINUED | OUTPATIENT
Start: 2020-01-01 | End: 2020-01-01 | Stop reason: HOSPADM

## 2020-01-01 RX ORDER — HYDROMORPHONE HYDROCHLORIDE 1 MG/ML
INJECTION, SOLUTION INTRAMUSCULAR; INTRAVENOUS; SUBCUTANEOUS
Status: DISPENSED
Start: 2020-01-01 | End: 2020-01-01

## 2020-01-01 RX ORDER — SODIUM CHLORIDE, SODIUM LACTATE, POTASSIUM CHLORIDE, CALCIUM CHLORIDE 600; 310; 30; 20 MG/100ML; MG/100ML; MG/100ML; MG/100ML
100 INJECTION, SOLUTION INTRAVENOUS CONTINUOUS
Status: DISCONTINUED | OUTPATIENT
Start: 2020-01-01 | End: 2020-01-01 | Stop reason: ALTCHOICE

## 2020-01-01 RX ORDER — MORPHINE SULFATE 2 MG/ML
4 INJECTION, SOLUTION INTRAMUSCULAR; INTRAVENOUS ONCE
Status: COMPLETED | OUTPATIENT
Start: 2020-01-01 | End: 2020-01-01

## 2020-01-01 RX ORDER — CEFAZOLIN SODIUM 1 G/3ML
INJECTION, POWDER, FOR SOLUTION INTRAMUSCULAR; INTRAVENOUS AS NEEDED
Status: DISCONTINUED | OUTPATIENT
Start: 2020-01-01 | End: 2020-01-01 | Stop reason: HOSPADM

## 2020-01-01 RX ORDER — HYDROMORPHONE HYDROCHLORIDE 1 MG/ML
1 INJECTION, SOLUTION INTRAMUSCULAR; INTRAVENOUS; SUBCUTANEOUS
Status: DISCONTINUED | OUTPATIENT
Start: 2020-01-01 | End: 2020-01-01

## 2020-01-01 RX ORDER — SODIUM CHLORIDE 0.9 % (FLUSH) 0.9 %
5-40 SYRINGE (ML) INJECTION EVERY 8 HOURS
Status: DISCONTINUED | OUTPATIENT
Start: 2020-01-01 | End: 2020-01-01 | Stop reason: HOSPADM

## 2020-01-01 RX ORDER — VECURONIUM BROMIDE FOR INJECTION 1 MG/ML
INJECTION, POWDER, LYOPHILIZED, FOR SOLUTION INTRAVENOUS AS NEEDED
Status: DISCONTINUED | OUTPATIENT
Start: 2020-01-01 | End: 2020-01-01 | Stop reason: HOSPADM

## 2020-01-01 RX ORDER — LIDOCAINE HYDROCHLORIDE 20 MG/ML
15 SOLUTION OROPHARYNGEAL AS NEEDED
Status: DISCONTINUED | OUTPATIENT
Start: 2020-01-01 | End: 2020-01-01 | Stop reason: HOSPADM

## 2020-01-01 RX ORDER — HYDROMORPHONE HYDROCHLORIDE 1 MG/ML
INJECTION, SOLUTION INTRAMUSCULAR; INTRAVENOUS; SUBCUTANEOUS
Status: COMPLETED
Start: 2020-01-01 | End: 2020-01-01

## 2020-01-01 RX ORDER — PANTOPRAZOLE SODIUM 40 MG/10ML
40 INJECTION, POWDER, LYOPHILIZED, FOR SOLUTION INTRAVENOUS
Status: COMPLETED | OUTPATIENT
Start: 2020-01-01 | End: 2020-01-01

## 2020-01-01 RX ORDER — HYDROMORPHONE HYDROCHLORIDE 1 MG/ML
2 INJECTION, SOLUTION INTRAMUSCULAR; INTRAVENOUS; SUBCUTANEOUS
Status: DISCONTINUED | OUTPATIENT
Start: 2020-01-01 | End: 2020-01-01 | Stop reason: HOSPADM

## 2020-01-01 RX ORDER — MAGNESIUM SULFATE 100 %
4 CRYSTALS MISCELLANEOUS AS NEEDED
Status: DISCONTINUED | OUTPATIENT
Start: 2020-01-01 | End: 2020-01-01 | Stop reason: HOSPADM

## 2020-01-01 RX ORDER — ONDANSETRON 2 MG/ML
4 INJECTION INTRAMUSCULAR; INTRAVENOUS ONCE
Status: COMPLETED | OUTPATIENT
Start: 2020-01-01 | End: 2020-01-01

## 2020-01-01 RX ORDER — HYDROMORPHONE HYDROCHLORIDE 1 MG/ML
INJECTION, SOLUTION INTRAMUSCULAR; INTRAVENOUS; SUBCUTANEOUS AS NEEDED
Status: DISCONTINUED | OUTPATIENT
Start: 2020-01-01 | End: 2020-01-01 | Stop reason: HOSPADM

## 2020-01-01 RX ORDER — DEXTROSE MONOHYDRATE 100 MG/ML
125-250 INJECTION, SOLUTION INTRAVENOUS AS NEEDED
Status: DISCONTINUED | OUTPATIENT
Start: 2020-01-01 | End: 2020-01-01 | Stop reason: HOSPADM

## 2020-01-01 RX ORDER — HYDROMORPHONE HYDROCHLORIDE 1 MG/ML
1 INJECTION, SOLUTION INTRAMUSCULAR; INTRAVENOUS; SUBCUTANEOUS
Status: DISCONTINUED | OUTPATIENT
Start: 2020-01-01 | End: 2020-01-01 | Stop reason: SDUPTHER

## 2020-01-01 RX ORDER — SODIUM CHLORIDE 9 MG/ML
125 INJECTION, SOLUTION INTRAVENOUS ONCE
Status: DISCONTINUED | OUTPATIENT
Start: 2020-01-01 | End: 2020-01-01

## 2020-01-01 RX ORDER — POTASSIUM CHLORIDE AND SODIUM CHLORIDE 900; 300 MG/100ML; MG/100ML
INJECTION, SOLUTION INTRAVENOUS CONTINUOUS
Status: DISCONTINUED | OUTPATIENT
Start: 2020-01-01 | End: 2020-01-01 | Stop reason: HOSPADM

## 2020-01-01 RX ORDER — FENTANYL CITRATE 50 UG/ML
INJECTION, SOLUTION INTRAMUSCULAR; INTRAVENOUS AS NEEDED
Status: DISCONTINUED | OUTPATIENT
Start: 2020-01-01 | End: 2020-01-01 | Stop reason: HOSPADM

## 2020-01-01 RX ORDER — MORPHINE SULFATE 2 MG/ML
2 INJECTION, SOLUTION INTRAMUSCULAR; INTRAVENOUS ONCE
Status: COMPLETED | OUTPATIENT
Start: 2020-01-01 | End: 2020-01-01

## 2020-01-01 RX ORDER — SODIUM CHLORIDE, SODIUM LACTATE, POTASSIUM CHLORIDE, CALCIUM CHLORIDE 600; 310; 30; 20 MG/100ML; MG/100ML; MG/100ML; MG/100ML
125 INJECTION, SOLUTION INTRAVENOUS CONTINUOUS
Status: DISCONTINUED | OUTPATIENT
Start: 2020-01-01 | End: 2020-01-01 | Stop reason: HOSPADM

## 2020-01-01 RX ORDER — DIPHENHYDRAMINE HYDROCHLORIDE 50 MG/ML
12.5 INJECTION, SOLUTION INTRAMUSCULAR; INTRAVENOUS
Status: COMPLETED | OUTPATIENT
Start: 2020-01-01 | End: 2020-01-01

## 2020-01-01 RX ORDER — MIDAZOLAM HYDROCHLORIDE 1 MG/ML
INJECTION, SOLUTION INTRAMUSCULAR; INTRAVENOUS AS NEEDED
Status: DISCONTINUED | OUTPATIENT
Start: 2020-01-01 | End: 2020-01-01 | Stop reason: HOSPADM

## 2020-01-01 RX ORDER — HYDROMORPHONE HYDROCHLORIDE 2 MG/ML
0.5 INJECTION, SOLUTION INTRAMUSCULAR; INTRAVENOUS; SUBCUTANEOUS
Status: DISCONTINUED | OUTPATIENT
Start: 2020-01-01 | End: 2020-01-01 | Stop reason: HOSPADM

## 2020-01-01 RX ORDER — FENTANYL CITRATE 50 UG/ML
50 INJECTION, SOLUTION INTRAMUSCULAR; INTRAVENOUS AS NEEDED
Status: DISCONTINUED | OUTPATIENT
Start: 2020-01-01 | End: 2020-01-01 | Stop reason: HOSPADM

## 2020-01-01 RX ORDER — SUCCINYLCHOLINE CHLORIDE 100 MG/5ML
SYRINGE (ML) INTRAVENOUS AS NEEDED
Status: DISCONTINUED | OUTPATIENT
Start: 2020-01-01 | End: 2020-01-01 | Stop reason: HOSPADM

## 2020-01-01 RX ORDER — LORAZEPAM 2 MG/ML
1 INJECTION INTRAMUSCULAR
Status: COMPLETED | OUTPATIENT
Start: 2020-01-01 | End: 2020-01-01

## 2020-01-01 RX ORDER — PROPOFOL 10 MG/ML
INJECTION, EMULSION INTRAVENOUS AS NEEDED
Status: DISCONTINUED | OUTPATIENT
Start: 2020-01-01 | End: 2020-01-01 | Stop reason: HOSPADM

## 2020-01-01 RX ORDER — DEXAMETHASONE SODIUM PHOSPHATE 4 MG/ML
INJECTION, SOLUTION INTRA-ARTICULAR; INTRALESIONAL; INTRAMUSCULAR; INTRAVENOUS; SOFT TISSUE AS NEEDED
Status: DISCONTINUED | OUTPATIENT
Start: 2020-01-01 | End: 2020-01-01 | Stop reason: HOSPADM

## 2020-01-01 RX ADMIN — HYDROMORPHONE HYDROCHLORIDE 1 MG: 1 INJECTION, SOLUTION INTRAMUSCULAR; INTRAVENOUS; SUBCUTANEOUS at 22:05

## 2020-01-01 RX ADMIN — HYDROMORPHONE HYDROCHLORIDE 1 MG: 1 INJECTION, SOLUTION INTRAMUSCULAR; INTRAVENOUS; SUBCUTANEOUS at 01:55

## 2020-01-01 RX ADMIN — FENTANYL CITRATE 50 MCG: 50 INJECTION, SOLUTION INTRAMUSCULAR; INTRAVENOUS at 08:55

## 2020-01-01 RX ADMIN — LORAZEPAM 1 MG: 2 INJECTION, SOLUTION INTRAMUSCULAR; INTRAVENOUS at 20:32

## 2020-01-01 RX ADMIN — SODIUM CHLORIDE AND POTASSIUM CHLORIDE: 9; 2.98 INJECTION, SOLUTION INTRAVENOUS at 13:02

## 2020-01-01 RX ADMIN — KETOROLAC TROMETHAMINE 30 MG: 30 INJECTION, SOLUTION INTRAMUSCULAR at 12:14

## 2020-01-01 RX ADMIN — KETOROLAC TROMETHAMINE 30 MG: 30 INJECTION, SOLUTION INTRAMUSCULAR at 00:40

## 2020-01-01 RX ADMIN — KETOROLAC TROMETHAMINE 30 MG: 30 INJECTION, SOLUTION INTRAMUSCULAR at 17:21

## 2020-01-01 RX ADMIN — HYDROMORPHONE HYDROCHLORIDE 2 MG: 1 INJECTION, SOLUTION INTRAMUSCULAR; INTRAVENOUS; SUBCUTANEOUS at 10:23

## 2020-01-01 RX ADMIN — POTASSIUM CHLORIDE 10 MEQ: 7.46 INJECTION, SOLUTION INTRAVENOUS at 10:45

## 2020-01-01 RX ADMIN — HYDROMORPHONE HYDROCHLORIDE 2 MG: 1 INJECTION, SOLUTION INTRAMUSCULAR; INTRAVENOUS; SUBCUTANEOUS at 16:46

## 2020-01-01 RX ADMIN — PANTOPRAZOLE SODIUM 40 MG: 40 INJECTION, POWDER, FOR SOLUTION INTRAVENOUS at 05:48

## 2020-01-01 RX ADMIN — KETOROLAC TROMETHAMINE 30 MG: 30 INJECTION, SOLUTION INTRAMUSCULAR at 06:29

## 2020-01-01 RX ADMIN — SODIUM CHLORIDE, SODIUM LACTATE, POTASSIUM CHLORIDE, AND CALCIUM CHLORIDE 100 ML/HR: 600; 310; 30; 20 INJECTION, SOLUTION INTRAVENOUS at 20:09

## 2020-01-01 RX ADMIN — HYDROMORPHONE HYDROCHLORIDE 1 MG: 1 INJECTION, SOLUTION INTRAMUSCULAR; INTRAVENOUS; SUBCUTANEOUS at 11:49

## 2020-01-01 RX ADMIN — HYDROMORPHONE HYDROCHLORIDE 1 MG: 1 INJECTION, SOLUTION INTRAMUSCULAR; INTRAVENOUS; SUBCUTANEOUS at 10:06

## 2020-01-01 RX ADMIN — HYDROMORPHONE HYDROCHLORIDE 1 MG: 1 INJECTION, SOLUTION INTRAMUSCULAR; INTRAVENOUS; SUBCUTANEOUS at 22:13

## 2020-01-01 RX ADMIN — HYDROMORPHONE HYDROCHLORIDE 2 MG: 1 INJECTION, SOLUTION INTRAMUSCULAR; INTRAVENOUS; SUBCUTANEOUS at 20:15

## 2020-01-01 RX ADMIN — ONDANSETRON 6 MG: 2 INJECTION INTRAMUSCULAR; INTRAVENOUS at 20:58

## 2020-01-01 RX ADMIN — CEFAZOLIN 2 G: 1 INJECTION, POWDER, FOR SOLUTION INTRAVENOUS at 07:24

## 2020-01-01 RX ADMIN — KETOROLAC TROMETHAMINE 30 MG: 30 INJECTION, SOLUTION INTRAMUSCULAR at 06:16

## 2020-01-01 RX ADMIN — SODIUM CHLORIDE 1000 ML: 900 INJECTION, SOLUTION INTRAVENOUS at 05:21

## 2020-01-01 RX ADMIN — FENTANYL CITRATE 50 MCG: 50 INJECTION, SOLUTION INTRAMUSCULAR; INTRAVENOUS at 07:40

## 2020-01-01 RX ADMIN — ONDANSETRON 6 MG: 2 INJECTION INTRAMUSCULAR; INTRAVENOUS at 12:53

## 2020-01-01 RX ADMIN — HYDROMORPHONE HYDROCHLORIDE 2 MG: 1 INJECTION, SOLUTION INTRAMUSCULAR; INTRAVENOUS; SUBCUTANEOUS at 06:16

## 2020-01-01 RX ADMIN — HYDROMORPHONE HYDROCHLORIDE 1 MG: 1 INJECTION, SOLUTION INTRAMUSCULAR; INTRAVENOUS; SUBCUTANEOUS at 02:15

## 2020-01-01 RX ADMIN — KETOROLAC TROMETHAMINE 30 MG: 30 INJECTION, SOLUTION INTRAMUSCULAR at 12:00

## 2020-01-01 RX ADMIN — FENTANYL CITRATE 50 MCG: 50 INJECTION, SOLUTION INTRAMUSCULAR; INTRAVENOUS at 08:40

## 2020-01-01 RX ADMIN — HYDROMORPHONE HYDROCHLORIDE 2 MG: 1 INJECTION, SOLUTION INTRAMUSCULAR; INTRAVENOUS; SUBCUTANEOUS at 04:09

## 2020-01-01 RX ADMIN — KETOROLAC TROMETHAMINE 30 MG: 30 INJECTION, SOLUTION INTRAMUSCULAR at 12:12

## 2020-01-01 RX ADMIN — SODIUM CHLORIDE AND POTASSIUM CHLORIDE: 9; 2.98 INJECTION, SOLUTION INTRAVENOUS at 09:55

## 2020-01-01 RX ADMIN — POTASSIUM CHLORIDE 10 MEQ: 7.46 INJECTION, SOLUTION INTRAVENOUS at 08:36

## 2020-01-01 RX ADMIN — MORPHINE SULFATE 4 MG: 2 INJECTION, SOLUTION INTRAMUSCULAR; INTRAVENOUS at 09:17

## 2020-01-01 RX ADMIN — ONDANSETRON 4 MG: 2 SOLUTION INTRAMUSCULAR; INTRAVENOUS at 07:12

## 2020-01-01 RX ADMIN — HYDROMORPHONE HYDROCHLORIDE 2 MG: 1 INJECTION, SOLUTION INTRAMUSCULAR; INTRAVENOUS; SUBCUTANEOUS at 16:31

## 2020-01-01 RX ADMIN — SODIUM CHLORIDE AND POTASSIUM CHLORIDE: 9; 2.98 INJECTION, SOLUTION INTRAVENOUS at 02:03

## 2020-01-01 RX ADMIN — VECURONIUM BROMIDE FOR INJECTION 1 MG: 1 INJECTION, POWDER, LYOPHILIZED, FOR SOLUTION INTRAVENOUS at 07:44

## 2020-01-01 RX ADMIN — HYDROMORPHONE HYDROCHLORIDE 2 MG: 1 INJECTION, SOLUTION INTRAMUSCULAR; INTRAVENOUS; SUBCUTANEOUS at 17:50

## 2020-01-01 RX ADMIN — HYDROMORPHONE HYDROCHLORIDE 1 MG: 1 INJECTION, SOLUTION INTRAMUSCULAR; INTRAVENOUS; SUBCUTANEOUS at 15:47

## 2020-01-01 RX ADMIN — HYDROMORPHONE HYDROCHLORIDE 1 MG: 1 INJECTION, SOLUTION INTRAMUSCULAR; INTRAVENOUS; SUBCUTANEOUS at 00:18

## 2020-01-01 RX ADMIN — HYDROMORPHONE HYDROCHLORIDE 1 MG: 1 INJECTION, SOLUTION INTRAMUSCULAR; INTRAVENOUS; SUBCUTANEOUS at 03:25

## 2020-01-01 RX ADMIN — KETOROLAC TROMETHAMINE 30 MG: 30 INJECTION, SOLUTION INTRAMUSCULAR at 23:28

## 2020-01-01 RX ADMIN — HYDROMORPHONE HYDROCHLORIDE 1 MG: 1 INJECTION, SOLUTION INTRAMUSCULAR; INTRAVENOUS; SUBCUTANEOUS at 08:27

## 2020-01-01 RX ADMIN — HYDROMORPHONE HYDROCHLORIDE 2 MG: 1 INJECTION, SOLUTION INTRAMUSCULAR; INTRAVENOUS; SUBCUTANEOUS at 20:58

## 2020-01-01 RX ADMIN — HYDROMORPHONE HYDROCHLORIDE 2 MG: 1 INJECTION, SOLUTION INTRAMUSCULAR; INTRAVENOUS; SUBCUTANEOUS at 08:20

## 2020-01-01 RX ADMIN — HYDROMORPHONE HYDROCHLORIDE 2 MG: 1 INJECTION, SOLUTION INTRAMUSCULAR; INTRAVENOUS; SUBCUTANEOUS at 12:14

## 2020-01-01 RX ADMIN — HYDROMORPHONE HYDROCHLORIDE 1 MG: 1 INJECTION, SOLUTION INTRAMUSCULAR; INTRAVENOUS; SUBCUTANEOUS at 15:16

## 2020-01-01 RX ADMIN — HYDROMORPHONE HYDROCHLORIDE 1 MG: 1 INJECTION, SOLUTION INTRAMUSCULAR; INTRAVENOUS; SUBCUTANEOUS at 19:29

## 2020-01-01 RX ADMIN — MORPHINE SULFATE 4 MG: 4 INJECTION, SOLUTION INTRAMUSCULAR; INTRAVENOUS at 05:43

## 2020-01-01 RX ADMIN — FENTANYL CITRATE 50 MCG: 50 INJECTION, SOLUTION INTRAMUSCULAR; INTRAVENOUS at 08:50

## 2020-01-01 RX ADMIN — HYDROMORPHONE HYDROCHLORIDE 1 MG: 1 INJECTION, SOLUTION INTRAMUSCULAR; INTRAVENOUS; SUBCUTANEOUS at 22:58

## 2020-01-01 RX ADMIN — SODIUM CHLORIDE, SODIUM LACTATE, POTASSIUM CHLORIDE, AND CALCIUM CHLORIDE 100 ML/HR: 600; 310; 30; 20 INJECTION, SOLUTION INTRAVENOUS at 22:15

## 2020-01-01 RX ADMIN — SODIUM CHLORIDE, SODIUM LACTATE, POTASSIUM CHLORIDE, AND CALCIUM CHLORIDE 100 ML/HR: 600; 310; 30; 20 INJECTION, SOLUTION INTRAVENOUS at 12:56

## 2020-01-01 RX ADMIN — HYDROMORPHONE HYDROCHLORIDE 1 MG: 1 INJECTION, SOLUTION INTRAMUSCULAR; INTRAVENOUS; SUBCUTANEOUS at 21:55

## 2020-01-01 RX ADMIN — HYDROMORPHONE HYDROCHLORIDE 2 MG: 1 INJECTION, SOLUTION INTRAMUSCULAR; INTRAVENOUS; SUBCUTANEOUS at 01:41

## 2020-01-01 RX ADMIN — SODIUM CHLORIDE AND POTASSIUM CHLORIDE: 9; 2.98 INJECTION, SOLUTION INTRAVENOUS at 20:44

## 2020-01-01 RX ADMIN — HYDROMORPHONE HYDROCHLORIDE 1 MG: 1 INJECTION, SOLUTION INTRAMUSCULAR; INTRAVENOUS; SUBCUTANEOUS at 06:07

## 2020-01-01 RX ADMIN — LORAZEPAM 1 MG: 2 INJECTION, SOLUTION INTRAMUSCULAR; INTRAVENOUS at 12:42

## 2020-01-01 RX ADMIN — PROPOFOL 50 MG: 10 INJECTION, EMULSION INTRAVENOUS at 07:20

## 2020-01-01 RX ADMIN — HYDROMORPHONE HYDROCHLORIDE 1 MG: 1 INJECTION, SOLUTION INTRAMUSCULAR; INTRAVENOUS; SUBCUTANEOUS at 17:27

## 2020-01-01 RX ADMIN — HYDROMORPHONE HYDROCHLORIDE 2 MG: 1 INJECTION, SOLUTION INTRAMUSCULAR; INTRAVENOUS; SUBCUTANEOUS at 12:22

## 2020-01-01 RX ADMIN — KETOROLAC TROMETHAMINE 30 MG: 30 INJECTION, SOLUTION INTRAMUSCULAR at 18:38

## 2020-01-01 RX ADMIN — HYDROMORPHONE HYDROCHLORIDE 2 MG: 1 INJECTION, SOLUTION INTRAMUSCULAR; INTRAVENOUS; SUBCUTANEOUS at 05:12

## 2020-01-01 RX ADMIN — SODIUM CHLORIDE AND POTASSIUM CHLORIDE: 9; 2.98 INJECTION, SOLUTION INTRAVENOUS at 23:08

## 2020-01-01 RX ADMIN — Medication 10 ML: at 09:00

## 2020-01-01 RX ADMIN — HYDROMORPHONE HYDROCHLORIDE 1 MG: 1 INJECTION, SOLUTION INTRAMUSCULAR; INTRAVENOUS; SUBCUTANEOUS at 08:36

## 2020-01-01 RX ADMIN — FENTANYL CITRATE 50 MCG: 50 INJECTION, SOLUTION INTRAMUSCULAR; INTRAVENOUS at 07:15

## 2020-01-01 RX ADMIN — SODIUM CHLORIDE AND POTASSIUM CHLORIDE: 9; 2.98 INJECTION, SOLUTION INTRAVENOUS at 11:55

## 2020-01-01 RX ADMIN — HYDROMORPHONE HYDROCHLORIDE 0.5 MG: 2 INJECTION INTRAMUSCULAR; INTRAVENOUS; SUBCUTANEOUS at 08:45

## 2020-01-01 RX ADMIN — MORPHINE SULFATE 2 MG: 2 INJECTION, SOLUTION INTRAMUSCULAR; INTRAVENOUS at 07:28

## 2020-01-01 RX ADMIN — KETOROLAC TROMETHAMINE 30 MG: 30 INJECTION, SOLUTION INTRAMUSCULAR at 12:52

## 2020-01-01 RX ADMIN — KETOROLAC TROMETHAMINE 30 MG: 30 INJECTION, SOLUTION INTRAMUSCULAR at 17:26

## 2020-01-01 RX ADMIN — KETOROLAC TROMETHAMINE 30 MG: 30 INJECTION, SOLUTION INTRAMUSCULAR at 23:20

## 2020-01-01 RX ADMIN — SODIUM CHLORIDE AND POTASSIUM CHLORIDE: 9; 2.98 INJECTION, SOLUTION INTRAVENOUS at 16:03

## 2020-01-01 RX ADMIN — METOCLOPRAMIDE 5 MG: 5 INJECTION, SOLUTION INTRAMUSCULAR; INTRAVENOUS at 05:45

## 2020-01-01 RX ADMIN — MIDAZOLAM 2 MG: 1 INJECTION INTRAMUSCULAR; INTRAVENOUS at 07:09

## 2020-01-01 RX ADMIN — PROPOFOL 150 MG: 10 INJECTION, EMULSION INTRAVENOUS at 07:16

## 2020-01-01 RX ADMIN — HYDROMORPHONE HYDROCHLORIDE 2 MG: 1 INJECTION, SOLUTION INTRAMUSCULAR; INTRAVENOUS; SUBCUTANEOUS at 18:28

## 2020-01-01 RX ADMIN — HYDROMORPHONE HYDROCHLORIDE 1 MG: 1 INJECTION, SOLUTION INTRAMUSCULAR; INTRAVENOUS; SUBCUTANEOUS at 14:24

## 2020-01-01 RX ADMIN — HYDROMORPHONE HYDROCHLORIDE 1 MG: 1 INJECTION, SOLUTION INTRAMUSCULAR; INTRAVENOUS; SUBCUTANEOUS at 17:00

## 2020-01-01 RX ADMIN — HYDROMORPHONE HYDROCHLORIDE 1 MG: 1 INJECTION, SOLUTION INTRAMUSCULAR; INTRAVENOUS; SUBCUTANEOUS at 20:06

## 2020-01-01 RX ADMIN — SODIUM CHLORIDE, SODIUM LACTATE, POTASSIUM CHLORIDE, AND CALCIUM CHLORIDE 100 ML/HR: 600; 310; 30; 20 INJECTION, SOLUTION INTRAVENOUS at 03:25

## 2020-01-01 RX ADMIN — HYDROMORPHONE HYDROCHLORIDE 1 MG: 1 INJECTION, SOLUTION INTRAMUSCULAR; INTRAVENOUS; SUBCUTANEOUS at 03:55

## 2020-01-01 RX ADMIN — HYDROMORPHONE HYDROCHLORIDE 0.5 MG: 2 INJECTION INTRAMUSCULAR; INTRAVENOUS; SUBCUTANEOUS at 08:25

## 2020-01-01 RX ADMIN — VECURONIUM BROMIDE FOR INJECTION 3 MG: 1 INJECTION, POWDER, LYOPHILIZED, FOR SOLUTION INTRAVENOUS at 07:22

## 2020-01-01 RX ADMIN — HYDROMORPHONE HYDROCHLORIDE 0.5 MG: 1 INJECTION, SOLUTION INTRAMUSCULAR; INTRAVENOUS; SUBCUTANEOUS at 08:35

## 2020-01-01 RX ADMIN — IOPAMIDOL 97 ML: 612 INJECTION, SOLUTION INTRAVENOUS at 07:49

## 2020-01-01 RX ADMIN — LIDOCAINE HYDROCHLORIDE 100 MG: 20 INJECTION, SOLUTION INTRAVENOUS at 07:16

## 2020-01-01 RX ADMIN — ONDANSETRON 6 MG: 2 INJECTION INTRAMUSCULAR; INTRAVENOUS at 09:16

## 2020-01-01 RX ADMIN — KETOROLAC TROMETHAMINE 30 MG: 30 INJECTION, SOLUTION INTRAMUSCULAR at 05:11

## 2020-01-01 RX ADMIN — SODIUM CHLORIDE, SODIUM LACTATE, POTASSIUM CHLORIDE, AND CALCIUM CHLORIDE 100 ML/HR: 600; 310; 30; 20 INJECTION, SOLUTION INTRAVENOUS at 06:13

## 2020-01-01 RX ADMIN — HYDROMORPHONE HYDROCHLORIDE 2 MG: 1 INJECTION, SOLUTION INTRAMUSCULAR; INTRAVENOUS; SUBCUTANEOUS at 14:27

## 2020-01-01 RX ADMIN — HYDROMORPHONE HYDROCHLORIDE 2 MG: 1 INJECTION, SOLUTION INTRAMUSCULAR; INTRAVENOUS; SUBCUTANEOUS at 15:07

## 2020-01-01 RX ADMIN — DIPHENHYDRAMINE HYDROCHLORIDE 12.5 MG: 50 INJECTION, SOLUTION INTRAMUSCULAR; INTRAVENOUS at 05:42

## 2020-01-01 RX ADMIN — HYDROMORPHONE HYDROCHLORIDE 0.5 MG: 2 INJECTION INTRAMUSCULAR; INTRAVENOUS; SUBCUTANEOUS at 09:00

## 2020-01-01 RX ADMIN — HYDROMORPHONE HYDROCHLORIDE 1 MG: 1 INJECTION, SOLUTION INTRAMUSCULAR; INTRAVENOUS; SUBCUTANEOUS at 11:21

## 2020-01-01 RX ADMIN — POTASSIUM CHLORIDE 10 MEQ: 7.46 INJECTION, SOLUTION INTRAVENOUS at 06:29

## 2020-01-01 RX ADMIN — ONDANSETRON 4 MG: 2 INJECTION INTRAMUSCULAR; INTRAVENOUS at 09:11

## 2020-01-01 RX ADMIN — SODIUM CHLORIDE AND POTASSIUM CHLORIDE: 9; 2.98 INJECTION, SOLUTION INTRAVENOUS at 23:40

## 2020-01-01 RX ADMIN — HYDROMORPHONE HYDROCHLORIDE 2 MG: 1 INJECTION, SOLUTION INTRAMUSCULAR; INTRAVENOUS; SUBCUTANEOUS at 20:32

## 2020-01-01 RX ADMIN — SODIUM CHLORIDE, SODIUM LACTATE, POTASSIUM CHLORIDE, AND CALCIUM CHLORIDE 100 ML/HR: 600; 310; 30; 20 INJECTION, SOLUTION INTRAVENOUS at 16:03

## 2020-01-01 RX ADMIN — HYDROMORPHONE HYDROCHLORIDE 1 MG: 1 INJECTION, SOLUTION INTRAMUSCULAR; INTRAVENOUS; SUBCUTANEOUS at 04:17

## 2020-01-01 RX ADMIN — HYDROMORPHONE HYDROCHLORIDE 1 MG: 1 INJECTION, SOLUTION INTRAMUSCULAR; INTRAVENOUS; SUBCUTANEOUS at 10:40

## 2020-01-01 RX ADMIN — SODIUM CHLORIDE, SODIUM LACTATE, POTASSIUM CHLORIDE, AND CALCIUM CHLORIDE 200 ML/HR: 600; 310; 30; 20 INJECTION, SOLUTION INTRAVENOUS at 05:42

## 2020-01-01 RX ADMIN — HYDROMORPHONE HYDROCHLORIDE 2 MG: 1 INJECTION, SOLUTION INTRAMUSCULAR; INTRAVENOUS; SUBCUTANEOUS at 07:58

## 2020-01-01 RX ADMIN — FENTANYL CITRATE 50 MCG: 50 INJECTION, SOLUTION INTRAMUSCULAR; INTRAVENOUS at 08:30

## 2020-01-01 RX ADMIN — HYDROMORPHONE HYDROCHLORIDE 1 MG: 1 INJECTION, SOLUTION INTRAMUSCULAR; INTRAVENOUS; SUBCUTANEOUS at 02:04

## 2020-01-01 RX ADMIN — HYDROMORPHONE HYDROCHLORIDE 2 MG: 1 INJECTION, SOLUTION INTRAMUSCULAR; INTRAVENOUS; SUBCUTANEOUS at 10:05

## 2020-01-01 RX ADMIN — KETOROLAC TROMETHAMINE 30 MG: 30 INJECTION, SOLUTION INTRAMUSCULAR at 18:30

## 2020-01-01 RX ADMIN — LIDOCAINE HYDROCHLORIDE 15 ML: 20 SOLUTION ORAL; TOPICAL at 20:32

## 2020-01-01 RX ADMIN — POTASSIUM CHLORIDE 10 MEQ: 7.46 INJECTION, SOLUTION INTRAVENOUS at 09:35

## 2020-01-01 RX ADMIN — HYDROMORPHONE HYDROCHLORIDE 2 MG: 1 INJECTION, SOLUTION INTRAMUSCULAR; INTRAVENOUS; SUBCUTANEOUS at 22:34

## 2020-01-01 RX ADMIN — HYDROMORPHONE HYDROCHLORIDE 1 MG: 1 INJECTION, SOLUTION INTRAMUSCULAR; INTRAVENOUS; SUBCUTANEOUS at 13:32

## 2020-01-01 RX ADMIN — HYDROMORPHONE HYDROCHLORIDE 1 MG: 1 INJECTION, SOLUTION INTRAMUSCULAR; INTRAVENOUS; SUBCUTANEOUS at 18:36

## 2020-01-01 RX ADMIN — ONDANSETRON 4 MG: 2 INJECTION INTRAMUSCULAR; INTRAVENOUS at 05:44

## 2020-01-01 RX ADMIN — DEXAMETHASONE SODIUM PHOSPHATE 4 MG: 4 INJECTION, SOLUTION INTRA-ARTICULAR; INTRALESIONAL; INTRAMUSCULAR; INTRAVENOUS; SOFT TISSUE at 07:15

## 2020-01-01 RX ADMIN — Medication 100 MG: at 07:16

## 2020-01-01 RX ADMIN — HYDROMORPHONE HYDROCHLORIDE 2 MG: 1 INJECTION, SOLUTION INTRAMUSCULAR; INTRAVENOUS; SUBCUTANEOUS at 23:28

## 2020-01-01 RX ADMIN — HYDROMORPHONE HYDROCHLORIDE 1 MG: 1 INJECTION, SOLUTION INTRAMUSCULAR; INTRAVENOUS; SUBCUTANEOUS at 08:15

## 2020-01-01 RX ADMIN — HYDROMORPHONE HYDROCHLORIDE 2 MG: 1 INJECTION, SOLUTION INTRAMUSCULAR; INTRAVENOUS; SUBCUTANEOUS at 12:53

## 2020-01-01 RX ADMIN — ONDANSETRON 6 MG: 2 INJECTION INTRAMUSCULAR; INTRAVENOUS at 19:26

## 2020-01-01 RX ADMIN — HYDROMORPHONE HYDROCHLORIDE 1 MG: 1 INJECTION, SOLUTION INTRAMUSCULAR; INTRAVENOUS; SUBCUTANEOUS at 06:11

## 2020-01-01 RX ADMIN — SODIUM CHLORIDE AND POTASSIUM CHLORIDE: 9; 2.98 INJECTION, SOLUTION INTRAVENOUS at 06:29

## 2020-01-01 RX ADMIN — HYDROMORPHONE HYDROCHLORIDE 2 MG: 1 INJECTION, SOLUTION INTRAMUSCULAR; INTRAVENOUS; SUBCUTANEOUS at 14:24

## 2020-02-27 PROBLEM — K56.609 SBO (SMALL BOWEL OBSTRUCTION) (HCC): Status: ACTIVE | Noted: 2020-01-01

## 2020-02-27 NOTE — PROGRESS NOTES
1045: Patient care assumed at this time. Patient is alert and oriented x4, denies complaints. IS at bedside and SCD on. IV sites are clean dry and intact with no redness or swelling. No skin breakdown noted. Patient educated regarding NPO status. NG tube to be placed after pain medication given. 1230: Patient unable to have NGT placement, this nurse attempted x2, NP attempted x2. Patient requested us to stop. 1435: Patient to x-ray. 1620:Patient back from X-RAY. Unable to place NGT, [place] stated MD Vazquez to see the patient. 1745: Patient in bed resting with call light in reach. Denies complaints, will continue to monitor.

## 2020-02-27 NOTE — PROGRESS NOTES
INTERVENTIONAL RADIOLOGY PROGRESS NOTE Attempted to place NG tube under fluoroscopic guidance. Patient would not allow us to advance the tube past his nasopharynx. Patient was pulling at & removing NG tube multiple times and was not able to remain still for the procedure. Discussed with Dr. Thi Seo & he also spoke with the patient. Despite multiple tries, the patient refused to let us continue to try & was uncooperative. Patient sent back upstairs & Dr. Thi Seo informed Dr. Karen Stuart. Ximena Johnson PA-C Vascular & Interventional Radiology [facility] 2/27/2020

## 2020-02-27 NOTE — PROGRESS NOTES
Problem: Falls - Risk of 
Goal: *Absence of Falls Description Document Geovanni Palma Fall Risk and appropriate interventions in the flowsheet. Outcome: Progressing Towards Goal 
Note: Fall Risk Interventions: 
  
 
  
 
Medication Interventions: Patient to call before getting OOB Problem: Patient Education: Go to Patient Education Activity Goal: Patient/Family Education Outcome: Progressing Towards Goal

## 2020-02-27 NOTE — PROGRESS NOTES
Problem: Discharge Planning Goal: *Discharge to safe environment Outcome: Progressing Towards Goal 
Plan is home Reason for Admission:   Small bowel obstruction RUR Score:  7% Plan for utilizing home health:       
 
PCP: First and Last name:   He had been with Dr J Luis Alonso, last appointment ( 3/2019)  he had changed to Dr Keren Clayton Name of Practice:  
 Are you a current patient: Yes/No:  
 Approximate date of last visit:  
                 
Current Advanced Directive/Advance Care Plan: no 
                      
Transition of Care Plan:    Chart reviewed   And pt did not seem to even want to talk with me. He has had numerous attempts for  NG placement since he came here , he had pulled them all out. He seems twitchy to me, he  did not get a Drug / Alcohol urine testing  for this hospitalization. But per chart, his possible twitchy movements may be from his 2019 ADD diagnosis. He says he lives with a friend, but would not elaborate. He said he has no family to add as contact. In the middle of our interview, he grabbed the urinal so I left. He has medicaid. His plan would be home, unsure of his transport. He did not say his friend could give or receive information Rita Lackey [phone]- the same as his home phone number. Care Management Interventions PCP Verified by CM: (he says he saw Dr J Luis Alonso a couple of months ago) Palliative Care Criteria Met (RRAT>21 & CHF Dx)?: No 
Mode of Transport at Discharge: Other (see comment)(Unsure) Transition of Care Consult (CM Consult): Discharge Planning MyChart Signup: No 
Discharge Durable Medical Equipment: No 
Physical Therapy Consult: No 
Occupational Therapy Consult: No 
Speech Therapy Consult: No 
Current Support Network: Other(says he lives with \"a friend\") The Plan for Transition of Care is Related to the Following Treatment Goals : obstruction is relieved and pt able to eat/ drink with out difficulty [employer] Information Provided?: No 
Discharge Location Discharge Placement: Home Claudio Abebe. Goran Sandoval RN, BSN [facility] [phone], Pager [phone] 
[email or web address]
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
## 2020-02-27 NOTE — PROGRESS NOTES
Attempted to place NG several times without success, discussed with Dr. Annabelle Snow, will call IR to see if they can place under fluro.

## 2020-02-27 NOTE — PROGRESS NOTES
completed the initial Spiritual Assessment of the patient in bed 12 of the emergency room , and offered Pastoral Care support to the patient. No family seen at this time. Patient was busy with nurse  At this time as well. Patient does not have any [place]/cultural needs that will affect patients preferences in health care. Chaplains will continue to follow and will provide pastoral care on an as needed/requested basis. Brianda Rodriguez Board Certified [employer] Spiritual Care Department [phone]

## 2020-02-27 NOTE — ED NOTES
2 failed attempts at NG tube insertion by this RN. 3 failed attempts by [facility]. Patient grabbing RNs arms and pulling tube during attempts. Patient requesting to stop at this time.

## 2020-02-27 NOTE — PROGRESS NOTES
Received call that attempted NG tube placement by IR was unsuccessful. Discussed with Dr. Danny Mortensen, he is aware and plans to see the patient.
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
## 2020-02-27 NOTE — PROGRESS NOTES
Problem: Discharge Planning Goal: *Discharge to safe environment Outcome: Progressing Towards Goal 
Plan is home

## 2020-02-27 NOTE — ED TRIAGE NOTES
Pt to ED via MS with complaints of abdominal pain 10/10 and vomiting x 2 days. Pt reports history of hernia within the last two years. Pt actively vomiting, dark brown emesis noted.

## 2020-02-27 NOTE — ED NOTES
TRANSFER - ED to INPATIENT REPORT: 
 
Verbal report given to Padmini Hall RN (name) on Fatoumata Lion.  being transferred to 2214 (unit) for routine progression of care Report consisted of patients Situation, Background, Assessment and  
Recommendations(SBAR). SBAR report made available to receiving floor on this patient being transferred to [address] [place] [address] (2200)  for routine progression of care Admitting diagnosis SBO (small bowel obstruction) ([facility] [D54.436] Information from the following report(s) SBAR was made available to receiving floor. Lines:    
 
HCG Status for ALL Females under 55 y/o: NO Opportunity for questions and clarification was provided. Patient is oriented to time, place, person and situation Patient is  continent and ambulatory with assist 
  
Valuables transported with patient Patient transported with: 
 Registered Nurse MAP (Monitor): 100 =Monitored (most recent) Vitals w/ MEWS Score (last day) Date/Time MEWS Score Pulse Resp Temp BP Level of Consciousness SpO2  
 02/27/20 10:21:01  1  75  18  98.1 °F (36.7 °C)  152/82  Alert  98 % 02/27/20 0950    75  20    152/82    98 % 02/27/20 0930    80      150/77    99 % 02/27/20 0811              97 % 02/27/20 0810          169/85    99 % 02/27/20 0746    89  18        98 % 02/27/20 0744          167/86      
 02/27/20 0730    73  22        96 % 02/27/20 0723    75  19        100 % 02/27/20 0720          150/82    100 % 02/27/20 0717    76  16        99 % 02/27/20 0540    71  (!) 31    154/75    100 % 02/27/20 0527  1  80  20  98.1 °F (36.7 °C)  151/87  Alert  100 % 02/27/20 0516    88  20        100 % 02/27/20 0510          151/87    99 % Septic Patients:  
 
Lactic Acid No results found for: LACPOC (Most recent on top) Repeat drawn: NO Time: NA

## 2020-02-28 NOTE — CDMP QUERY
Pt admitted with Bowel obstruction. Pt noted to have decreased sodium level on admission. If possible, please document in the progress notes and d/c summary if you are evaluating and / or treating any of the following: ? Hyponatremia ? Pseudo hyponatremia 
? Other, please specify ? Clinically unable to determine ? The medical record reflects the following: 
  Risk Factors: 57 yo male admitted with bowel obstruction Clinical Indicators: Serum sodium on admission 128 mmol/L 
                                                                     2/28    123 mmol/L Treatment: Serial labs, IV fluids Thank you, 
Brant Richter RN -[phone]
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
## 2020-02-28 NOTE — PROGRESS NOTES
2009: Patient in bed awake,alert and oriented x4. No signs of distress. Bed low and locked. Call bell within reach. Will continue monitoring. 0630: Assisted Dr. Roc Jane to insert NGT tube,attempted x3 but everytime  the patient will pull it out,unsuccesful, will monitor at this time,call bell and valuables within reach. Patient Vitals for the past 12 hrs: 
 Temp Pulse Resp BP SpO2  
02/28/20 0155 98 °F (36.7 °C) 78 19 159/60 96 % 02/28/20 0125 98 °F (36.7 °C)      
02/27/20 2009 98.4 °F (36.9 °C) 78 18 150/90  Bedside and Verbal shift change report given to [facility] (oncoming nurse) by Chica Rojo (offgoing nurse). Report included the following information SBAR, Kardex, MAR and Recent Results.

## 2020-02-28 NOTE — PROGRESS NOTES
Assumed care of patient MICHAEL Jamison. Patient awake in bed. A & O x4, denies pain. Call bell and frequently used items within reach. HOB elevated and locked at lowest position. 1006-Patient given Dilaudid 1mg IV for pain 9/10. See MAR and pain assessment ; reassessment and POSS. Call bell within reach.

## 2020-02-28 NOTE — PROGRESS NOTES
2007: Patient is awake,alert and oriented x4. No signs of distress. Bed low and locked. Call bell within reach. Will continue monitoring.

## 2020-02-28 NOTE — ROUTINE PROCESS
Bedside shift change report given to Marce Angeles (oncoming nurse) by Jhon Huff (offgoing nurse). Report included the following information SBAR, Kardex, Intake/Output and MAR.

## 2020-02-28 NOTE — PROGRESS NOTES
Problem: Falls - Risk of 
Goal: *Absence of Falls Description Document Jimmie Hinkle Fall Risk and appropriate interventions in the flowsheet. Outcome: Progressing Towards Goal 
Note: Fall Risk Interventions: 
Mobility Interventions: Patient to call before getting OOB Medication Interventions: Patient to call before getting OOB, Teach patient to arise slowly Problem: Patient Education: Go to Patient Education Activity Goal: Patient/Family Education Outcome: Progressing Towards Goal 
  
Problem: General Infection Care Plan (Adult and Pediatric) Goal: Improvement in signs and symptoms of infection Outcome: Progressing Towards Goal 
Goal: *Optimize nutritional status Outcome: Progressing Towards Goal 
  
Problem: Patient Education: Go to Patient Education Activity Goal: Patient/Family Education Outcome: Progressing Towards Goal 
  
Problem: Pain Goal: *Control of Pain Outcome: Progressing Towards Goal 
  
Problem: Patient Education: Go to Patient Education Activity Goal: Patient/Family Education Outcome: Progressing Towards Goal 
  
Problem: Discharge Planning Goal: *Discharge to safe environment Outcome: Progressing Towards Goal 
  
Problem: Pressure Injury - Risk of 
Goal: *Prevention of pressure injury Description Document Brando Scale and appropriate interventions in the flowsheet. Outcome: Progressing Towards Goal 
Note: Pressure Injury Interventions: Activity Interventions: Increase time out of bed, Pressure redistribution bed/mattress(bed type) Mobility Interventions: HOB 30 degrees or less, Pressure redistribution bed/mattress (bed type) Nutrition Interventions: Document food/fluid/supplement intake Problem: Patient Education: Go to Patient Education Activity Goal: Patient/Family Education Outcome: Progressing Towards Goal

## 2020-02-28 NOTE — PROGRESS NOTES
Problem: Discharge Planning Goal: *Discharge to safe environment Outcome: Progressing Towards Goal 
Plan is home Plan remains home
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
## 2020-02-28 NOTE — PROGRESS NOTES
Problem: Falls - Risk of 
Goal: *Absence of Falls Description Document Kar Baez Fall Risk and appropriate interventions in the flowsheet. Outcome: Progressing Towards Goal 
Note: Fall Risk Interventions: 
  
 
  
 
Medication Interventions: Patient to call before getting OOB, Teach patient to arise slowly, Utilize gait belt for transfers/ambulation Problem: Patient Education: Go to Patient Education Activity Goal: Patient/Family Education Outcome: Progressing Towards Goal 
  
Problem: General Infection Care Plan (Adult and Pediatric) Goal: Improvement in signs and symptoms of infection Outcome: Progressing Towards Goal 
Goal: *Optimize nutritional status Outcome: Progressing Towards Goal 
  
Problem: Patient Education: Go to Patient Education Activity Goal: Patient/Family Education Outcome: Progressing Towards Goal 
  
Problem: Pain Goal: *Control of Pain Outcome: Progressing Towards Goal 
  
Problem: Patient Education: Go to Patient Education Activity Goal: Patient/Family Education Outcome: Progressing Towards Goal 
  
Problem: Discharge Planning Goal: *Discharge to safe environment Outcome: Progressing Towards Goal

## 2020-02-29 NOTE — PROGRESS NOTES
Pt transported out by surgical team after giving report that pt last administered pain med 0330 and was cleaned w/ Chlorhexidine  Wipes.
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
## 2020-02-29 NOTE — PERIOP NOTES
TRANSFER - OUT REPORT: 
 
Verbal report given to conchita rn(name) on Cy Mccain.  being transferred to [facility](unit) for routine post - op Report consisted of patients Situation, Background, Assessment and  
Recommendations(SBAR). Information from the following report(s) SBAR, OR Summary, Intake/Output and MAR was reviewed with the receiving nurse. Lines:  
Peripheral IV 02/27/20 Anterior;Left;Proximal Forearm (Active) Site Assessment Clean, dry, & intact 2/29/2020  9:00 AM  
Phlebitis Assessment 0 2/29/2020  9:00 AM  
Infiltration Assessment 0 2/29/2020  9:00 AM  
Dressing Status Clean, dry, & intact 2/29/2020  9:00 AM  
Dressing Type Transparent;Tape 2/29/2020  9:00 AM  
Hub Color/Line Status Green 2/28/2020  8:40 AM  
Action Taken Open ports on tubing capped 2/28/2020  8:40 AM  
Alcohol Cap Used Yes 2/28/2020  8:40 AM  
  
 
Opportunity for questions and clarification was provided. Patient transported with: 
 Registered Nurse 0945 patient transferred to [facility] in stable condition no family in attendance

## 2020-02-29 NOTE — ANESTHESIA POSTPROCEDURE EVALUATION
Procedure(s): LAPAROTOMY EXPLORATORY and reduction of interneal hernia. 
 
general 
 
Anesthesia Post Evaluation Multimodal analgesia: multimodal analgesia used between 6 hours prior to anesthesia start to PACU discharge Patient location during evaluation: PACU Patient participation: complete - patient participated Level of consciousness: awake Pain score: 5 Pain management: adequate Airway patency: patent Anesthetic complications: no 
Cardiovascular status: acceptable Respiratory status: acceptable Hydration status: acceptable Comments: Patient is opiate tolerant. Post anesthesia nausea and vomiting:  none Vitals Value Taken Time /82 2/29/2020  8:40 AM  
Temp 36.5 °C (97.7 °F) 2/29/2020  8:25 AM  
Pulse 71 2/29/2020  8:45 AM  
Resp 13 2/29/2020  8:45 AM  
SpO2 98 % 2/29/2020  8:45 AM  
Vitals shown include unvalidated device data.

## 2020-02-29 NOTE — PROGRESS NOTES
12:00 Dr. Juli Davis called and informed of pts potasium drawn at 0430 am , level 3.1.   No orders given, except to redraw lab in am.

## 2020-02-29 NOTE — PROGRESS NOTES
Bedside and Verbal shift change report given to Hans Bar RN (oncoming nurse) by Kylah Carpio RN (offgoing nurse). Report included the following information SBAR, Kardex, Intake/Output, MAR and Recent Results.

## 2020-02-29 NOTE — BRIEF OP NOTE
BRIEF OPERATIVE NOTE Date of Procedure: 2/29/2020 Preoperative Diagnosis: bowel perforation Postoperative Diagnosis: bowel perforation Procedure(s): LAPAROTOMY EXPLORATORY and reduction of internal hernia and lysis of adhesions. Surgeon(s) and Role: Nan Gregory MD - Primary Surgical Staff: 
Circ-1: Quan Haynes RN 
Circ-Relief: Pushpa Vickers RN Scrub Tech-1: Carlitos Mcwilliams Surg Asst-1: Mindy Cortes Event Time In Time Out Incision Start 02/29/2020 8997 Incision Close 02/29/2020 0801 Anesthesia: General  
Estimated Blood Loss: Minimal. 
Specimens:  
ID Type Source Tests Collected by Time Destination 1 : [address] [place] [address],[address] Preservative Abdomen  Ander Carranza MD 2/29/2020 0982 Pathology Findings: Severe adhesions and internal hernia with kinking. Complications: None. Implants: * No implants in log *
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
## 2020-03-01 NOTE — ROUTINE PROCESS
6957  Received critical potassium results of 2.8 from lab. MD notified. New orders for four runs of Potassium 10 mEq every hour for four hours and start NS with K+ 40 mEq at 100mL/hour. MD on his way into the facility to conduct rounds at this time. Updated that patient has approximately 200 mL of drainage an hour from NGT. MD stated that patient is completely NPO at this time. Unable to have ice chips. MD also notified that patient notified this nurse that he takes Methadone 80mg daily at home. Patient is currently on Dilaudid 1 mg every two hours as needed for pain and is requesting it every two hours.  
 
Rodrigue Hutson, RN, BSN

## 2020-03-01 NOTE — PROGRESS NOTES
Patient seen and examined He is feeling much better than yesterday before the surgery. He told the nurse today that he is on methadone but he did not tell anyone when he was admitted His NG tube output is still high but he is taking ice chips He felt that he was going to pass some flatus and had a bowel movement but he did not He has no fever or tachycardia His potassium was low so it was replaced and his IV fluid was changed His abdomen is soft and tender at the incision but nondistended and the wound is clean Plan: 
Keep NG tube Keep n.p.o. except sips of water and ice chips until return of bowel function Start methadone once the NG tube is out Ambulation Labs tomorrow Correct his hypokalemia Dr. Davy Tran will be covering for me for Monday and Tuesday until I come back on Wednesday

## 2020-03-01 NOTE — ROUTINE PROCESS
3728  Bedside and Verbal shift change report given to Janeen Olivo RN, BSN (oncoming nurse) by Fer Coyle RN, BSN (offgoing nurse). Report included the following information SBAR, Kardex, ED Summary, OR Summary, Procedure Summary, Intake/Output, MAR and Recent Results.   
 
Fer Coyle RN, BSN

## 2020-03-01 NOTE — PROGRESS NOTES
Problem: Falls - Risk of 
Goal: *Absence of Falls Description Document Olivia Johnson Fall Risk and appropriate interventions in the flowsheet. Outcome: Progressing Towards Goal 
Note: Fall Risk Interventions: 
Mobility Interventions: Bed/chair exit alarm Medication Interventions: Teach patient to arise slowly Elimination Interventions: Bed/chair exit alarm, Call light in reach, Elevated toilet seat Problem: Patient Education: Go to Patient Education Activity Goal: Patient/Family Education Outcome: Progressing Towards Goal 
  
Problem: General Infection Care Plan (Adult and Pediatric) Goal: Improvement in signs and symptoms of infection Outcome: Progressing Towards Goal 
Goal: *Optimize nutritional status Outcome: Progressing Towards Goal 
  
Problem: Patient Education: Go to Patient Education Activity Goal: Patient/Family Education Outcome: Progressing Towards Goal 
  
Problem: Pain Goal: *Control of Pain Outcome: Progressing Towards Goal 
  
Problem: Patient Education: Go to Patient Education Activity Goal: Patient/Family Education Outcome: Progressing Towards Goal 
  
Problem: Discharge Planning Goal: *Discharge to safe environment Outcome: Progressing Towards Goal 
  
Problem: Pressure Injury - Risk of 
Goal: *Prevention of pressure injury Description Document Brando Scale and appropriate interventions in the flowsheet. Outcome: Progressing Towards Goal 
Note: Pressure Injury Interventions: 
Sensory Interventions: Chair cushion, Maintain/enhance activity level, Keep linens dry and wrinkle-free Activity Interventions: Increase time out of bed Mobility Interventions: HOB 30 degrees or less Nutrition Interventions: (npo) Problem: Patient Education: Go to Patient Education Activity Goal: Patient/Family Education Outcome: Progressing Towards Goal 
  
Problem: Nutrition Deficit Goal: *Optimize nutritional status Outcome: Progressing Towards Goal

## 2020-03-01 NOTE — PROGRESS NOTES
Problem: Falls - Risk of 
Goal: *Absence of Falls Description Document Stefany Chowdary Fall Risk and appropriate interventions in the flowsheet. Outcome: Progressing Towards Goal 
Note: Fall Risk Interventions: 
Mobility Interventions: Patient to call before getting OOB Medication Interventions: Evaluate medications/consider consulting pharmacy, Patient to call before getting OOB Elimination Interventions: Call light in reach, Patient to call for help with toileting needs Problem: Patient Education: Go to Patient Education Activity Goal: Patient/Family Education Outcome: Progressing Towards Goal 
  
Problem: Pain Goal: *Control of Pain Outcome: Progressing Towards Goal 
  
Problem: Patient Education: Go to Patient Education Activity Goal: Patient/Family Education Outcome: Progressing Towards Goal

## 2020-03-01 NOTE — PROGRESS NOTES
Patient received in bed awake. Patient A&Ox4, denies pain and discomfort. No distress noted. Frequently use items within reach. Bed locked in low position. Call bell within reach and Patient verbalized understanding of use for assistance and needs. 4277- Patient given Dilaudid 1 mg IV for abd pain 9/10. See MAR and pain assessments. 1516- Patient given Dilaudid 1 mg IV for abd pain 9/10. See MAR and pain assessments. 1727-  Patient given Dilaudid 1 mg IV for abd pain 9/10. See MAR and pain assessments.

## 2020-03-01 NOTE — PROGRESS NOTES
Problem: Falls - Risk of 
Goal: *Absence of Falls Description Document Malcolm Brush Fall Risk and appropriate interventions in the flowsheet. Outcome: Progressing Towards Goal 
Note: Fall Risk Interventions: 
Mobility Interventions: Patient to call before getting OOB Medication Interventions: Patient to call before getting OOB, Teach patient to arise slowly Elimination Interventions: Call light in reach, Urinal in reach Problem: Patient Education: Go to Patient Education Activity Goal: Patient/Family Education Outcome: Progressing Towards Goal 
  
Problem: General Infection Care Plan (Adult and Pediatric) Goal: Improvement in signs and symptoms of infection Outcome: Progressing Towards Goal 
Goal: *Optimize nutritional status Outcome: Progressing Towards Goal 
  
Problem: Patient Education: Go to Patient Education Activity Goal: Patient/Family Education Outcome: Progressing Towards Goal 
  
Problem: Pain Goal: *Control of Pain Outcome: Progressing Towards Goal 
  
Problem: Patient Education: Go to Patient Education Activity Goal: Patient/Family Education Outcome: Progressing Towards Goal 
  
Problem: Discharge Planning Goal: *Discharge to safe environment Outcome: Progressing Towards Goal 
  
Problem: Pressure Injury - Risk of 
Goal: *Prevention of pressure injury Description Document Brando Scale and appropriate interventions in the flowsheet. Outcome: Progressing Towards Goal 
Note: Pressure Injury Interventions: 
Sensory Interventions: Keep linens dry and wrinkle-free Activity Interventions: Increase time out of bed Mobility Interventions: HOB 30 degrees or less, Pressure redistribution bed/mattress (bed type) Nutrition Interventions: Document food/fluid/supplement intake, Offer support with meals,snacks and hydration Problem: Patient Education: Go to Patient Education Activity Goal: Patient/Family Education Outcome: Progressing Towards Goal 
  
Problem: Nutrition Deficit Goal: *Optimize nutritional status Outcome: Progressing Towards Goal

## 2020-03-02 NOTE — PROGRESS NOTES
Assumed care of patient from [address]. Patient awake in bed. A & O x 4, denies pain. Call bell and frequently used items within reach. HOB elevated and locked at lowest position. 0827-Patient given Dilaudid 1mg PO for pain 10/10. See MAR and pain assessment ; reassessment and POSS. Call bell within reach. 1040-Patient given Dilaudid 1mg PO for pain 10/10. See MAR and pain assessment ; reassessment and POSS. Call bell within reach.

## 2020-03-02 NOTE — PROGRESS NOTES
Problem: Falls - Risk of 
Goal: *Absence of Falls Description Document Fabiana Martines Fall Risk and appropriate interventions in the flowsheet. Outcome: Progressing Towards Goal 
Note: Fall Risk Interventions: 
Mobility Interventions: Patient to call before getting OOB Medication Interventions: Evaluate medications/consider consulting pharmacy, Patient to call before getting OOB Elimination Interventions: Call light in reach, Patient to call for help with toileting needs Problem: Patient Education: Go to Patient Education Activity Goal: Patient/Family Education Outcome: Progressing Towards Goal 
  
Problem: General Infection Care Plan (Adult and Pediatric) Goal: Improvement in signs and symptoms of infection Outcome: Progressing Towards Goal 
Goal: *Optimize nutritional status Outcome: Progressing Towards Goal 
  
Problem: Patient Education: Go to Patient Education Activity Goal: Patient/Family Education Outcome: Progressing Towards Goal 
  
Problem: Pain Goal: *Control of Pain Outcome: Progressing Towards Goal 
  
Problem: Patient Education: Go to Patient Education Activity Goal: Patient/Family Education Outcome: Progressing Towards Goal 
  
Problem: Discharge Planning Goal: *Discharge to safe environment Outcome: Progressing Towards Goal 
  
Problem: Pressure Injury - Risk of 
Goal: *Prevention of pressure injury Description Document Brando Scale and appropriate interventions in the flowsheet. Outcome: Progressing Towards Goal 
Note: Pressure Injury Interventions: 
Sensory Interventions: Maintain/enhance activity level, Keep linens dry and wrinkle-free Activity Interventions: Increase time out of bed, Pressure redistribution bed/mattress(bed type) Mobility Interventions: HOB 30 degrees or less, Pressure redistribution bed/mattress (bed type) Nutrition Interventions: Document food/fluid/supplement intake Problem: Patient Education: Go to Patient Education Activity Goal: Patient/Family Education Outcome: Progressing Towards Goal 
  
Problem: Nutrition Deficit Goal: *Optimize nutritional status Outcome: Progressing Towards Goal

## 2020-03-02 NOTE — ADT AUTH CERT NOTES
REF# [id number] Stew Schmidt!  I am resending the clinical- but you should have already received it-

## 2020-03-02 NOTE — PROGRESS NOTES
1939: Patient in bed awake,alert and oriented x4. No signs of distress. State not happy about not getting his pain medication on time after surgery wants to make sure he is getting pain medication every 2 hours . Bed low and locked. Call bell and valuables within reach. Will monitor. 1196: In bed resting quietly,call bell and valuables within reach. Will continue monitoring. Patient Vitals for the past 12 hrs: 
 Temp Pulse Resp BP SpO2  
03/02/20 0404 97.6 °F (36.4 °C) 67 18 160/82 96 % 03/02/20 0011 98.8 °F (37.1 °C) 64 18 164/83 98 % 03/01/20 2004 97.7 °F (36.5 °C) 67 18 160/90 98 %

## 2020-03-02 NOTE — ROUTINE PROCESS
Bedside and Verbal shift change report given to Tyler Estevez RN (oncoming nurse) by Joyce Sebastian (offgoing nurse). Report included the following information SBAR, Kardex, MAR and Recent Results.

## 2020-03-02 NOTE — PROGRESS NOTES
Inpatient Daily Progress Note Subjective: Yonis Lizzette. Pain is not well controlled. has not had nausea has not vomiting has not passed flatus has not had a bowel movement Objective:  
 
 
Physical Exam: 
Visit Vitals /87 (BP 1 Location: Right arm, BP Patient Position: At rest) Pulse 67 Temp 97.9 °F (36.6 °C) Resp 18 Ht 5' 6\" (1.676 m) SpO2 99% BMI 24.86 kg/m² Gen: Well developed, well nourished 56 y.o. male in no acute distress Resp: clear to auscultation bilaterally, no wheezes rhonchi or rales, excursions normal and symmetrical 
Cardio: Regular rate and rhythm, no murmurs, clicks, gallops or rubs, no edema Abdomen: soft, nontender, nondistended, normoactive bowel sounds, no hernias Psych: alert and oriented to person, place and time Recent Results (from the past 12 hour(s)) CBC WITH AUTOMATED DIFF Collection Time: 03/02/20  4:00 AM  
Result Value Ref Range WBC 4.7 4.6 - 13.2 K/uL  
 RBC 4.24 (L) 4.70 - 5.50 M/uL  
 HGB 13.3 13.0 - 16.0 g/dL HCT 39.4 36.0 - 48.0 % MCV 92.9 74.0 - 97.0 FL  
 MCH 31.4 24.0 - 34.0 PG  
 MCHC 33.8 31.0 - 37.0 g/dL  
 RDW 13.2 11.6 - 14.5 % PLATELET 687 617 - 209 K/uL MPV 10.2 9.2 - 11.8 FL  
 NEUTROPHILS 80 (H) 40 - 73 % LYMPHOCYTES 6 (L) 21 - 52 % MONOCYTES 14 (H) 3 - 10 % EOSINOPHILS 0 0 - 5 % BASOPHILS 0 0 - 2 %  
 ABS. NEUTROPHILS 3.7 1.8 - 8.0 K/UL  
 ABS. LYMPHOCYTES 0.3 (L) 0.9 - 3.6 K/UL  
 ABS. MONOCYTES 0.7 0.05 - 1.2 K/UL  
 ABS. EOSINOPHILS 0.0 0.0 - 0.4 K/UL  
 ABS. BASOPHILS 0.0 0.0 - 0.1 K/UL  
 DF AUTOMATED METABOLIC PANEL, BASIC Collection Time: 03/02/20  4:00 AM  
Result Value Ref Range Sodium 142 136 - 145 mmol/L Potassium 3.7 3.5 - 5.5 mmol/L Chloride 98 (L) 100 - 111 mmol/L  
 CO2 36 (H) 21 - 32 mmol/L Anion gap 8 3.0 - 18 mmol/L Glucose 123 (H) 74 - 99 mg/dL BUN 28 (H) 7.0 - 18 MG/DL  Creatinine 0.88 0.6 - 1.3 MG/DL  
 BUN/Creatinine ratio 32 (H) 12 - 20 GFR est AA >60 >60 ml/min/1.73m2 GFR est non-AA >60 >60 ml/min/1.73m2 Calcium 8.5 8.5 - 10.1 MG/DL Assessment: Fay Lewis is a 56 y.o. male His pain is not under good control. Will increase the dilaudid and hopefully get the ng out soon. Then convert to methadon. Physical Exam:   
 
General:   
  
Eyes:  negative, conjunctivae and sclerae normal, pupils equal, round, reactive to light Throat & Neck: normal, no erythema or exudates noted and neck supple and symmetrical; no palpable masses Lungs:   clear to auscultation bilaterally  No shortness of breath Heart:  Regular rate and rhythm Abdomen:   flat, soft, nontender, nondistended, no masses or organomegaly,NG output still high. Color changing. Wound looks good. Incision clean. He complains a lot about pain. He is on Methadone. Will up his dilaudid in the short run while we are waiting to get his NG out then convert back to Methadon. Extremities: extremities normal, atraumatic, no cyanosis or edema Skin: Normal.  
   
Plan:  
 
-Continue current medications Enio Hernadez MD

## 2020-03-02 NOTE — ROUTINE PROCESS
Bedside and Verbal shift change report given to Amor RN (oncoming nurse) by Aure Simon RN, BSN (offgoing nurse). Report given with SBAR, Kardex, Intake/Output, MAR and Recent Results.
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
## 2020-03-02 NOTE — PROGRESS NOTES
Problem: Discharge Planning Goal: *Discharge to safe environment Outcome: Progressing Towards Goal 
Plan is home 
  
Plan remains home, cm to follow for  services.

## 2020-03-03 NOTE — PROGRESS NOTES
Patient seen in room this afternoon. NGT has been removed and Pt receiving CL tray for lunch. Denies any N/V today and has been passing flatus and belching but still no BM as of yet. Discussed the importance that he needs to take it slow and start with only sips of clear fluids to assess tolerance; Pt agreed. Explained that he can actually stretch the tray from now until dinner tray is delivered. Also added Ensure Clear TID to supplement energy needs for now. Will continue to monitor diet advancement.

## 2020-03-03 NOTE — PROGRESS NOTES
1000: PT orders and chart reviewed. Refusing mobility until NG tube removed completely. Will follow up.

## 2020-03-03 NOTE — PROGRESS NOTES
Problem: Falls - Risk of 
Goal: *Absence of Falls Description Document Darren Reas Fall Risk and appropriate interventions in the flowsheet. Outcome: Progressing Towards Goal 
Note: Fall Risk Interventions: 
Mobility Interventions: Patient to call before getting OOB Medication Interventions: Patient to call before getting OOB, Teach patient to arise slowly Elimination Interventions: Call light in reach, Patient to call for help with toileting needs, Urinal in reach Problem: Patient Education: Go to Patient Education Activity Goal: Patient/Family Education Outcome: Progressing Towards Goal 
  
Problem: General Infection Care Plan (Adult and Pediatric) Goal: Improvement in signs and symptoms of infection Outcome: Progressing Towards Goal 
Goal: *Optimize nutritional status Outcome: Progressing Towards Goal 
  
Problem: Patient Education: Go to Patient Education Activity Goal: Patient/Family Education Outcome: Progressing Towards Goal 
  
Problem: Pain Goal: *Control of Pain Outcome: Progressing Towards Goal 
  
Problem: Patient Education: Go to Patient Education Activity Goal: Patient/Family Education Outcome: Progressing Towards Goal 
  
Problem: Discharge Planning Goal: *Discharge to safe environment Outcome: Progressing Towards Goal 
  
Problem: Pressure Injury - Risk of 
Goal: *Prevention of pressure injury Description Document Brando Scale and appropriate interventions in the flowsheet. Outcome: Progressing Towards Goal 
Note: Pressure Injury Interventions: 
Sensory Interventions: Assess changes in LOC Activity Interventions: Increase time out of bed, Pressure redistribution bed/mattress(bed type) Mobility Interventions: Pressure redistribution bed/mattress (bed type) Nutrition Interventions: Document food/fluid/supplement intake, Offer support with meals,snacks and hydration Problem: Patient Education: Go to Patient Education Activity Goal: Patient/Family Education Outcome: Progressing Towards Goal 
  
Problem: Nutrition Deficit Goal: *Optimize nutritional status Outcome: Progressing Towards Goal

## 2020-03-03 NOTE — ROUTINE PROCESS
Bedside and Verbal shift change report given to Mitzy Santoro (oncoming nurse) by Beronica Roth, RN, BSN (offgoing nurse). Report included the following information SBAR, Kardex, ED Summary, OR Summary, Procedure Summary, Intake/Output, MAR and Recent Results.   
 
Beronica Roth, RN, BSN

## 2020-03-03 NOTE — PROGRESS NOTES
Bedside shift report received from Annika Oneal with sbar 0830 Dilaudid given for pain Patient very argumenative, requests that he does not cuss Dr. Pauline Alexis in to see patient. Ngt suction turned off 1030 Dilaudid given for pain Patient again cussing No nausea 1220 ngt removed, taking sips of clears 1230 dilaudid given for pain Working with physio 1430 dilaudid given for pain 1630 dilaudid given for pain 1830 dilaudid 
 given for pain Bedside shift report  Given to [facility] and Annika Oneal Patient vomited x1 zofran given Late entry 1028 patient given Dilaudid 2 mg iv

## 2020-03-03 NOTE — PROGRESS NOTES
Inpatient Daily Progress Note Subjective: Ansley Perla Pain is well controlled. has not had nausea has not vomiting has passed flatus has not had a bowel movement Objective:  
 
 
Physical Exam: 
Visit Vitals /84 (BP 1 Location: Right arm) Pulse 69 Temp 98 °F (36.7 °C) Resp 18 Ht 5' 6\" (1.676 m) SpO2 100% BMI 24.86 kg/m² Gen: Well developed, well nourished 56 y.o. male in no acute distress Resp: clear to auscultation bilaterally, no wheezes rhonchi or rales, excursions normal and symmetrical 
Cardio: Regular rate and rhythm, no murmurs, clicks, gallops or rubs, no edema Abdomen: soft, nontender, nondistended, normoactive bowel sounds, no hernias Psych: alert and oriented to person, place and time No results found for this or any previous visit (from the past 12 hour(s)). Assessment: Ansley Perla is a 56 y.o. male in with a bowel obstruction requiring surgery Physical Exam:   
 
General:  in no apparent distress Eyes:  conjunctivae and sclerae normal, pupils equal, round, reactive to light Throat & Neck: no erythema or exudates noted and neck supple and symmetrical; no palpable masses Lungs:   clear to auscultation bilaterally Heart:  Regular rate and rhythm Abdomen:   Non distended. Few BS. Passing gas. Ng out put down and more clear Extremities: extremities normal, atraumatic, no cyanosis or edema Skin: Normal.  
   
Plan:  
 
-Continue current medications NG to gravity If no nausea by noon D/C ng 
PT consult to get patient up. Clear liq once ng out.   
 
Tasha Tirado MD

## 2020-03-03 NOTE — PROGRESS NOTES
completed a follow up visit with and Spiritual assessment of patient in room 2214 this morning as therapy came into the room. Chaplains will continue to follow and will provide pastoral care on an as needed/requested basis Reiseñor 3 Board Certified [address] [place] [address]  
[phone]
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
## 2020-03-03 NOTE — PROGRESS NOTES
Bedside and Verbal shift change report given to Rogelio Gooden (oncoming nurse) by Robin Crain RN (offgoing nurse). Report included the following information SBAR, Kardex, Intake/Output, MAR and Recent Results.

## 2020-03-04 NOTE — PROGRESS NOTES
Patient seen and examined. I did review the notes and the events from the last  couple days when I was off It seems the patient was doing relatively well and he had multiple flatus so the NG tube was taken out and he was started on liquid it seems that last night the patient had multiple episodes of nausea and also couple episodes of vomiting for which they start to 2-3 kept him n.p.o. and an order of NG tube was placed but they were not able to place it in. . And it seems early in the morning around 6 in the morning the patient stated that he would like to go [address] and then he changed his mind [employer] When I saw the patient today he stated that he is feeling better and he had multiple flatus but still no bowel movement He said that he does not feel nauseous and he did not vomit today but he felt like he spit up a little bit He took liquid diet at lunchtime and he tolerated it well He has no fever but he had mild tachycardia earlier however he is not tachycardic currently His last WBC 2 days ago was 4.7 His abdomen is soft and nontender His midline wound is healing well Plan: 
Keep on the liquid diet for now and do not advance till he has a bowel movement Ambulation Close observation for developing of ileus Get the exact dose of his Methadone and start him on the methadone dose and cancel his other narcotics Labs tomorrow

## 2020-03-04 NOTE — PROGRESS NOTES
1930  Patient vommitted large amount of dark colored liquid during report. Patient stated that he had been taking down clear liquids slowly prior. Patient denied drinking the broth but he did say he was drinking the gingerale before we came in. I threw away the drink. Patient was cleaned and gown changed. Noted IV is on right arm. 
 
1945  Patient threw up 3 more times while I was in the room doing my assessments. Patient asked for a cup of ice and I noticed every time he ate even a small piece of ice he would throw up. I took away the ice and advised patient not to put anything by mouth. Gave patient a bucket to use for emesis. Reported vomiting to Charge nurse. 2000  Per Charge nurse there is now an order to put an NG tube to patient since he has been vomiting. Patient did not cooperate, insisted to wait for the doctor. Charge nurse explained the importance of putting the tube now. Patient was combative and uncooperative. Patient has vomited and was cleaned. 2100  Patient vomited again. 2523  Patient has been sleeping since he had the dillaudid and ativan. Responds to voice but most of the time just sleeping. Has not vomited since I cleaned him at 2100. He took off his gown and just used the flat sheet to cover himself. IV pump was beeping and open when I made my rounds, patient denies touching it but was holding the tube. Reminded not to touch machine. 6383  Patient in bed sleeping. Patient no clothes. Closed door for privacy. 0630  Patient has been calling and calling for everything. He stated he feels so good and he wants to walk around. We advised him that he needs to be assessed by PT first. Patient has been stating that he wants to go home. Reported to CN, CN went to pt's room to talk to him if he wants to do AMA, patient refused. 0720  Bedside and Verbal shift change report given to Danielle Liu (oncoming nurse) by Andrés Scott (offgoing nurse).  Report included the following information Margarita BARBOZA and MAR.

## 2020-03-04 NOTE — PROGRESS NOTES
Inpatient Daily Progress Note Subjective: Ansley Perla Pain is well controlled. has had nausea has vomiting has passed flatus has not had a bowel movement Objective:  
 
 
Physical Exam: 
Visit Vitals BP (!) 137/91 (BP 1 Location: Right arm, BP Patient Position: At rest) Pulse 91 Temp 97.7 °F (36.5 °C) Resp 18 Ht 5' 6\" (1.676 m) SpO2 91% BMI 24.86 kg/m² Gen: Well developed, well nourished 56 y.o. male in no acute distress Resp: clear to auscultation bilaterally, no wheezes rhonchi or rales, excursions normal and symmetrical 
Cardio: Regular rate and rhythm, no murmurs, clicks, gallops or rubs, no edema Abdomen: soft, nontender, nondistended, normoactive bowel sounds, no hernias Psych: alert and oriented to person, place and time No results found for this or any previous visit (from the past 12 hour(s)). Assessment: Ansley Perla is a 56 y.o. male S/P lysis of adhesion Physical Exam:   
 
General:  in no apparent distress Eyes:  conjunctivae and sclerae normal, pupils equal, round, reactive to light Throat & Neck: no erythema or exudates noted and neck supple and symmetrical; no palpable masses Lungs:   clear to auscultation bilaterally Heart:  Regular rate and rhythm Abdomen:   Abdom soft. BS present. Wound looks good Extremities: extremities normal, atraumatic, no cyanosis or edema Skin: Normal.  
   
Plan:  
 
-Continue current medications Start clear liquids.   
 
Tasha Tirado MD

## 2020-03-04 NOTE — ROUTINE PROCESS
Patient requested something for pain. Upon entering room, noted patient walking around nude looking for something to drink. No noted unsteady gait. No assistance needed. No assistive devices. Patient stated that this nurse scared him because he did not hear her come into the room. Dilaudid administered. Patient in low bed with call bell within reach and bed locked for safety.  
 
Ever Trinidad, RN, BSN

## 2020-03-04 NOTE — ROUTINE PROCESS
Administered Dilaudid 2 mg for complaint of pain. Noted patient belching and passing flatulence. Waited five minutes and administered Ativan 1 mg for noted anxiety. Patient stated that he wants to rest a little while prior to NGT insertion. Gave patient fifteen minutes to rest prior to NGT. Patient resisted NGT placement. Patient kept pulling this nurse's hand and pulled NGT away from nurse's hand. Patient states that he wants to wait for the MD. NGT placement unsuccessful at this time.  
 
Mckenzie Yusuf, RN, BSN

## 2020-03-04 NOTE — PROGRESS NOTES
Received message from [employer], pt's insurance has lapsed. Has pending melani jeanmarie on file. Notified pt. Pt currently uninsured.

## 2020-03-04 NOTE — ROUTINE PROCESS
Notified by primary nurse that patient has had emesis times three. Dark green in color. NGT removed at approximately 1220 this afternoon. Zofran 6 mg administered at approximately 1926 for the first episode of nausea. Dr. Octaviano Jaime called and message left to contact facility. New orders for replacement of NGT and KUB after placement. Nursing supervisor updated on incident.  
 
Sander Isaacs RN, BSN

## 2020-03-04 NOTE — PROGRESS NOTES
EPAS 
 
Checked [employer]. Pt was eligible thru 2/29/2020 with [employer]. Not eligible in March. Updated CM. Carlos Fontanez RN Outcomes Manager 
[phone]

## 2020-03-04 NOTE — PROGRESS NOTES
Patient received in bed awake. Patient A&Ox4; c/o abd pain and discomfort. No distress noted. Frequently use items within reach. Bed locked in low position. Call bell within reach and Patient verbalized understanding of use for assistance and needs. 3436- Patient given Dilaudid 2 mg IV for abd pain 9/10. See MAR and pain assessments. 1922- Dr. Solange Gonzalez to [facility] station and informed this nurse that Patient may have Clear liquid diet and ice chips. Also said that if he haven't ordered the ice chips to please order (RBV). 1005- Patient given Dilaudid 2 mg IV for abd pain 9/10. See MAR and pain assessments. 1214- Patient given Dilaudid 2 mg IV for abd pain 9/10. See MAR and pain assessments. 200- Dr. Cornell Bernard to [facility] station asked about Patient. Informed that Patient is tolerating PO intake and requesting prn Dilaudid every two hours. Dr. Cornell Bernard said to call to resume out-pt Methadone and dc IV Dilaudid once Methadone beings. (RBV) [id number]- Patient gave permission for this nurse to fax Medical Release Form for proof of being a Patient, Methadone dosage, last dose received and name of person releasing information  and signed. [facility] [phone] ([address]) was called message recording that office closed spoke with Justina Noguera said closed today at one [de-identified]. 80- Dr. Cornell Bernard was called made aware [facility] closed today at 13:30, to be addressed tomorrow am; voiced understanding. 1424- Patient given Dilaudid 2 mg IV for abd pain 9/10. See MAR and pain assessments. 1646- Patient given Dilaudid 2 mg IV for abd pain 10/10. See MAR and pain assessments.

## 2020-03-04 NOTE — ROUTINE PROCESS
Call placed to surgeon. Patient states that he believes that he is well enough to go home. Surgeon placed patient NPO because of nausea with emesis. Surgeon notified of patient's ambulation in the room. Surgeon stated that patient may go home AMA. Nurse discussed risks and patient agreed to stay. Informed of NPO status and able to verbalize 100% understanding. Patient dressed in hospital gown. Stated that he will stay in bed until after shift change. In bed watching the news. Call bell within reach and bed locked in lowest position. Urinal at bedside. Bedside and Verbal shift change report given to Cielo Larsen RN, BSN (oncoming nurse) by Yadira Roque RN, BSN (offgoing nurse). Report included the following information SBAR, Kardex, ED Summary, OR Summary, Procedure Summary, Intake/Output, MAR and Recent Results.   
 
Yadira Roque RN, BSN

## 2020-03-04 NOTE — PROGRESS NOTES
Problem: Falls - Risk of 
Goal: *Absence of Falls Description Document James President Fall Risk and appropriate interventions in the flowsheet. Outcome: Progressing Towards Goal 
Note: Fall Risk Interventions: 
Mobility Interventions: Patient to call before getting OOB Mentation Interventions: Adequate sleep, hydration, pain control Medication Interventions: Evaluate medications/consider consulting pharmacy Elimination Interventions: Call light in reach, Urinal in reach Problem: Patient Education: Go to Patient Education Activity Goal: Patient/Family Education Outcome: Progressing Towards Goal 
  
Problem: General Infection Care Plan (Adult and Pediatric) Goal: Improvement in signs and symptoms of infection Outcome: Progressing Towards Goal 
Goal: *Optimize nutritional status Outcome: Progressing Towards Goal 
  
Problem: Patient Education: Go to Patient Education Activity Goal: Patient/Family Education Outcome: Progressing Towards Goal 
  
Problem: Pain Goal: *Control of Pain Outcome: Progressing Towards Goal 
  
Problem: Patient Education: Go to Patient Education Activity Goal: Patient/Family Education Outcome: Progressing Towards Goal 
  
Problem: Discharge Planning Goal: *Discharge to safe environment Outcome: Progressing Towards Goal 
  
Problem: Pressure Injury - Risk of 
Goal: *Prevention of pressure injury Description Document Brando Scale and appropriate interventions in the flowsheet. Outcome: Progressing Towards Goal 
Note: Pressure Injury Interventions: 
Sensory Interventions: Assess changes in LOC Activity Interventions: Increase time out of bed Mobility Interventions: HOB 30 degrees or less Nutrition Interventions: Document food/fluid/supplement intake Problem: Patient Education: Go to Patient Education Activity Goal: Patient/Family Education Outcome: Progressing Towards Goal 
  
Problem: Nutrition Deficit Goal: *Optimize nutritional status Outcome: Progressing Towards Goal 
  
Problem: Patient Education: Go to Patient Education Activity Goal: Patient/Family Education Outcome: Progressing Towards Goal

## 2020-03-04 NOTE — PROGRESS NOTES
Problem: Mobility Impaired (Adult and Pediatric) Goal: *Acute Goals and Plan of Care (Insert Text) Description Physical Therapy Goals Initiated 3/3/2020 and to be accomplished within 7 day(s) 1. Patient will move from supine to sit and sit to supine  in bed with modified independence. 2.  Patient will transfer from bed to chair and chair to bed with modified independence using the least restrictive device. 3.  Patient will perform sit to stand with modified independence. 4.  Patient will ambulate with modified independence for 150 feet with the least restrictive device. 5.  Patient will ascend/descend 3 stairs with handrail(s) with modified independence. Outcome: Progressing Towards Goal 
 PHYSICAL THERAPY TREATMENT Patient: Lizeth Smart (62 y.o. male) Date: 3/4/2020 [facility] [V30.405] <principal problem not specified> Procedure(s) (LRB): 
LAPAROTOMY EXPLORATORY and reduction of interneal hernia (N/A) 4 Days Post-Op Precautions:   
PLOF: Independent ASSESSMENT: 
Pt progressing well today, motivated to get out of bed and amb in room. Pt just received IV dilaudid, did not progress to stair training for safety at this time. Pt CGA for safety in room, in chair with call bell and instruction to only get up with nursing assist. Pt verbalized understanding and  nurse notified. Will see one more session for stair training. Progression toward goals:  
[x]      Improving appropriately and progressing toward goals 
[]      Improving slowly and progressing toward goals 
[]      Not making progress toward goals and plan of care will be adjusted PLAN: 
Patient continues to benefit from skilled intervention to address the above impairments. Continue treatment per established plan of care. Discharge Recommendations:  Home Health Further Equipment Recommendations for Discharge:  N/A  
 
SUBJECTIVE:  
 Patient stated Archana Saldivar woke up last night and thought I was home.  OBJECTIVE DATA SUMMARY:  
Critical Behavior: 
Neurologic State: Alert Orientation Level: Oriented to person, Oriented to place, Oriented to time Cognition: Decreased attention/concentration, Decreased command following, Impaired decision making, Impulsive Safety/Judgement: Awareness of environment Functional Mobility Training: 
Bed Mobility: 
Supine to Sit: Supervision Sit to Supine: Supervision Transfers: 
Sit to Stand: Modified independent Stand to Sit: Modified independent Balance: 
Sitting: Intact Standing: Impaired Standing - Static: Good Standing - Dynamic : Good Ambulation/Gait Training: 
Distance (ft): 30 Feet (ft) Ambulation - Level of Assistance: Contact guard assistance Base of Support: Narrowed Pain: 
Pain level pre-treatment: 0/10 Pain level post-treatment: 0/10 Pain Intervention(s): n/a Activity Tolerance:  
Good Please refer to the flowsheet for vital signs taken during this treatment. After treatment:  
[x] Patient left in no apparent distress sitting up in chair 
[] Patient left in no apparent distress in bed 
[x] Call bell left within reach [x] Nursing notified 
[] Caregiver present 
[] Bed alarm activated 
[] SCDs applied COMMUNICATION/EDUCATION:  
[]          
[]         Fall prevention education was provided and the patient/caregiver indicated understanding. []         Patient/family have participated as able in working toward goals and plan of care. []         Patient/family agree to work toward stated goals and plan of care. []         Patient understands intent and goals of therapy, but is neutral about his/her participation. []         Patient is unable to participate in stated goals/plan of care: ongoing with therapy staff. [x]         Role of Physical Therapy in the acute care setting. Stewart Vidal PTA Time Calculation: 16 mins

## 2020-03-05 NOTE — ANESTHESIA PROCEDURE NOTES
Emergent Intubation Performed by: Kieran Lozano CRNA Authorized by: iKeran Lozano CRNA Emergent Intubation:  
Patient location: at patient bedside. Date/Time:  3/5/2020 1:00 AM 
Indications:  Respiratory failure Spontaneous Ventilation: absent Level of Consciousness: unresponsive Preoxygenated: Yes Airway Documentation: Airway:  LMA Technique:  Blind LMA Size:  4 Attempts:  1 Difficult airway: Yes Called to code blue in progress with bag/mask ventilation. Copious amounts coffee ground bloody emesis present. DL times 3 with attempts to place 7.5 ETT . Unable to visualize cords/landmarks. Number 5 LMA placed with bag ventilation without issue. Ventilation continued.

## 2020-03-05 NOTE — ROUTINE PROCESS
1918  Bedside and Verbal shift change report given to Rodrigue Hutson RN, BSN (oncoming nurse) by Iman Vazquez RN, BSN (offgoing nurse). Report included the following information SBAR, Kardex, ED Summary, OR Summary, Procedure Summary, Intake/Output, MAR and Recent Results. 2015  Patient requesting ice and pain medications. Dilaudid 2 mg administered. Educated patient that he cannot have copious amounts of ice and water. Patient stated that he had been getting fluids all day. Patient given approximately 30 mL of ice. Patient demanded more ice than 30 mL. Patient given approximately 100 mL of ice. In bed with call bell within reach and bed in lowest position. 2109  Patient requesting more ice. Patient notified that he cannot have a lot of liquids. Educated patient on surgeon's orders regarding his fluid intake. Patient stated to this nurse that she is mean. 2130  Noted patient walking around in room. Changed hospital gown because of noted emesis. Patient stated that he has not been vomiting and that the gown is old. 2230  Patient requesting pain medication. Dilaudid 2 mg administered. Patient requesting more ice chips. Patient notified that he could not have any more ice chips at this time and would get more ice chips with next dose of pain medication. 2315  Patient calling multiple times for ice and water. 200  Entered patient's room to administer pain medications. Noted no respirations and no pulse. Code blue called. Head of bed lowered and compressions started. Keila VAZQUEZ pronounced patient at the bedside. MD attempting to call next of kin. Surgeon and surgeon on call notified.  
 
Rodrigue Hutson RN, BSN

## 2020-03-05 NOTE — PROGRESS NOTES
INTERIM UPDATE - 0120 EST on 3/05/2020 Called to Patient's Room via [address] of Adult Code Blue. Patient was noted to be unresponsive laying on his back with vomit matted on his left beard and face with copious amounts of vomiting on the mattress, floor, and in an emesis tray. Patient had no pulse and was not breathing. CPR was started at 0100 EST on 3/05/2020. Approximately 7 rounds of CPR was performed with rhythm checks yielding no carotid or peripheral pulses and heart monitor showing Asystole. Multiple doses of Epinephrine were administered. Endotracheal intubation was attempted, but could not be performed due to anterior positioning, rigidity of Patient, and reddish brown vomitus of water-like consistency in his airway and oral cavity as compressions continued, despite repeated suctioning. An LMA was eventually placed. During Ambu bagging and compressions, reddish brown vomitus was seen splashing into the connection of the Ambu bag. Auscultation during pulse check with Ambu bagging hear significantly reduced lung sounds over the right chest compared to the left. CPR ceased at 0115 EST with no pulse and Asystole on monitor. Patient did not achieve ROSC and no shock was delivered. Nursing Staff reports that for the last two days Patient has been drinking fluids liberally despite having fluids restricted and had been constantly asking for more fluids. Aspiration appears to be the product of this consumption of fluids. A call was placed to Patient's Emergency Contact, Corinna Arellanoing, but this phone number has been disconnected per recorded message playing prior to phone call automatically terminating.

## 2020-03-05 NOTE — PROGRESS NOTES
Post-Mortem care done. Removed all lines and tubes. Labeled all tags and belongs given to security including wallet, and clothing. [facility]

## 2020-03-05 NOTE — ROUTINE PROCESS
Post mortem care performed with nursing supervisor. Noted eight different containers with fluids in them. Five were 500 mL cups, one can of ginger ale, two containers of juice. Also noted four different wash basins. One with approximately 150 mL brown colored emesis hidden under the bed, two empty wash basins with paper towels, and one wash basin in the corner of the window with water. Patient was suctioned prior to post mortem care related to copious amounts of brown emesis still in the body. Nursing supervisor transferred body to [facility] with security.  
 
Lukasz Fonseca, RN, BSN

## 2020-03-05 NOTE — PROGRESS NOTES
4686: Responded to code blue,  2, 2C nurses, 2 RT, dr Seven Capellan at bedside  
 
0100: Pulse check and compressions started 
 
0102: Pulse check and epi 
 
0104: pulse check, Asystole 0105: 2nd Epi 
 
0106: Pulse check, asystole  
 
0108: Pulse check, asystole and Epi in  
 
0110: pulse check, asystole 
 
0111: Epi in 
 
0113: Pulse check, asystole 0115: pulse check, asystole. Dr. Sheng Kimble at bedside, called time of death

## 2020-03-05 NOTE — PROGRESS NOTES
visited for Maggie Siddiqui, who is a 56 y. o.,male. The  provided the following Interventions: 
 
Code blue from the 2200. Even after multiple CPR patient passed. There was no family members, relatives or friends present. Doctors tried to contact emergency person contact numbers and it was disconnected. Chaplain Fr Michael Gruber [facility]  
[phone]

## 2020-03-05 NOTE — ROUTINE PROCESS
Bedside and Verbal shift change report given to Nataliya Sands, RN (oncoming nurse) by Caro Cristobal RN, BSN (offgoing nurse). Report given with SBAR, Kardex, Intake/Output, MAR and Recent Results.

## 2020-03-05 NOTE — PROGRESS NOTES
Problem: Falls - Risk of 
Goal: *Absence of Falls Description Document Jordan Carrero Fall Risk and appropriate interventions in the flowsheet. Outcome: Progressing Towards Goal 
Note: Fall Risk Interventions: 
Mobility Interventions: Patient to call before getting OOB Mentation Interventions: Update white board Medication Interventions: Teach patient to arise slowly Elimination Interventions: Call light in reach Problem: Patient Education: Go to Patient Education Activity Goal: Patient/Family Education Outcome: Progressing Towards Goal 
  
Problem: General Infection Care Plan (Adult and Pediatric) Goal: Improvement in signs and symptoms of infection Outcome: Progressing Towards Goal 
Goal: *Optimize nutritional status Outcome: Progressing Towards Goal 
  
Problem: Patient Education: Go to Patient Education Activity Goal: Patient/Family Education Outcome: Progressing Towards Goal 
  
Problem: Pain Goal: *Control of Pain Outcome: Progressing Towards Goal 
  
Problem: Patient Education: Go to Patient Education Activity Goal: Patient/Family Education Outcome: Progressing Towards Goal 
  
Problem: Discharge Planning Goal: *Discharge to safe environment Outcome: Progressing Towards Goal 
  
Problem: Pressure Injury - Risk of 
Goal: *Prevention of pressure injury Description Document Brando Scale and appropriate interventions in the flowsheet. Outcome: Progressing Towards Goal 
Note: Pressure Injury Interventions: 
Sensory Interventions: Assess changes in LOC Activity Interventions: Pressure redistribution bed/mattress(bed type) Mobility Interventions: Pressure redistribution bed/mattress (bed type) Nutrition Interventions: Document food/fluid/supplement intake, Discuss nutritional consult with provider, Offer support with meals,snacks and hydration Problem: Patient Education: Go to Patient Education Activity Goal: Patient/Family Education Outcome: Progressing Towards Goal 
  
Problem: Nutrition Deficit Goal: *Optimize nutritional status Outcome: Progressing Towards Goal 
  
Problem: Patient Education: Go to Patient Education Activity Goal: Patient/Family Education Outcome: Progressing Towards Goal

## 2020-03-05 NOTE — PROGRESS NOTES
INTERIM UPDATE - [id number] EST on 3/05/2020 Unresponsive Patient underwent 15 minutes of CPR and did not achieve ROSC and Asystole was consistently noted on the heart monitor despite multiple administrations of IV Epinephrine. Patient did not respond to visual, auditory, tactile, or nociceptive stimuli. Patient did not initiate any self-motivated movement during the 15-minute Code. Patient had no radial, carotid, or femoral pulse. Heart sounds, lung sounds, and bowel sounds were absent. Patient's right pupil was fixed and dilated unequally with left pupil. Corneal reflex and Gag reflex were absent. Christopher RojoRenaldo Farrar. was pronounced  at 56 EST on 3/05/2020 by Prudence ShedRenaldo Son D.O. Family was not present at bedside. A call was placed to Emergency Contact's phone number listed in EHR and was found to be disconnected. A discreet message was left on Patient's cell phone voicemail with a call back number in the hopes that it was in the possession of Patient's Emergency Contact. Patient's Attending Physician was notified. Information regarding times and persons of interest contacted, as well as final arrangements, are described elsewhere.
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
## 2020-03-24 NOTE — PROGRESS NOTES
Order was changed to 2mg Hydromorphone instead of 1mg. Called supervisor Roman Lemus RN to come and return medication since pyxis was stated that nurse retrieved  2mg. Medication count was plus one, comment written by supervisor on medication. 2mg Hydromorphone was given.

## (undated) DEVICE — LIGHT HANDLE: Brand: DEVON

## (undated) DEVICE — SUTURE PDS II SZ 1 L27IN ABSRB VLT CT-1 L36MM 1/2 CIR Z341H

## (undated) DEVICE — DEPAUL MAJOR PROCEDURE PACK: Brand: MEDLINE INDUSTRIES, INC.

## (undated) DEVICE — TAPE, MEDFIX EZ, SELF WOUND, 6"X11YD: Brand: MEDLINE

## (undated) DEVICE — INSULATED BLADE ELECTRODE: Brand: EDGE

## (undated) DEVICE — SOLUTION IRRIG 1000ML H2O STRL BLT

## (undated) DEVICE — STAPLER SKIN H3.9MM WIRE DIA0.58MM CRWN 6.9MM 35 STPL FIX

## (undated) DEVICE — ELECTRODE BLDE L4IN NONINSULATED EDGE

## (undated) DEVICE — DRESSING,GAUZE,XEROFORM,CURAD,1"X8",ST: Brand: CURAD

## (undated) DEVICE — SUTURE VCRL SZ 3-0 L27IN ABSRB UD L26MM SH 1/2 CIR J416H

## (undated) DEVICE — SLEEVE COMPR THGH LEN MED TEAR AWAY GARM SCD EXPRESS [DVT30] [MEDTRONIC COVIDIEN KENDALL]

## (undated) DEVICE — TOWEL SURG W16XL26IN BLU NONFENESTRATED DLX ST 2 PER PK

## (undated) DEVICE — SUTURE VCRL SZ 2-0 L12X18IN ABSRB UD POLYGLACTIN 910 BRAID J911T

## (undated) DEVICE — CURVED, LARGE JAW, OPEN SEALER/DIVIDER NANO-COATED: Brand: LIGASURE IMPACT

## (undated) DEVICE — TISSUE FUSION OPEN INSTRUMENT: Brand: LIGASURE ATLAS

## (undated) DEVICE — DRAPE THER FLUID WARMING 66X44 IN FLAT SLUSH DBL DISC ORS

## (undated) DEVICE — PREP SKN CHLRAPRP 26ML TNT -- CONVERT TO ITEM 373320

## (undated) DEVICE — SOL IRR NACL 0.9% 500ML POUR --

## (undated) DEVICE — STERILE POLYISOPRENE POWDER-FREE SURGICAL GLOVES: Brand: PROTEXIS

## (undated) DEVICE — INTENDED FOR TISSUE SEPARATION, AND OTHER PROCEDURES THAT REQUIRE A SHARP SURGICAL BLADE TO PUNCTURE OR CUT.: Brand: BARD-PARKER ® CARBON RIB-BACK BLADES